# Patient Record
Sex: FEMALE | Race: WHITE | NOT HISPANIC OR LATINO | Employment: OTHER | URBAN - METROPOLITAN AREA
[De-identification: names, ages, dates, MRNs, and addresses within clinical notes are randomized per-mention and may not be internally consistent; named-entity substitution may affect disease eponyms.]

---

## 2018-02-12 ENCOUNTER — CLINICAL SUPPORT (OUTPATIENT)
Dept: CARDIOLOGY | Facility: CLINIC | Age: 33
End: 2018-02-12
Payer: MEDICARE

## 2018-02-12 ENCOUNTER — LAB VISIT (OUTPATIENT)
Dept: LAB | Facility: OTHER | Age: 33
End: 2018-02-12
Payer: MEDICARE

## 2018-02-12 ENCOUNTER — OFFICE VISIT (OUTPATIENT)
Dept: CARDIOLOGY | Facility: CLINIC | Age: 33
End: 2018-02-12
Payer: MEDICARE

## 2018-02-12 ENCOUNTER — TELEPHONE (OUTPATIENT)
Dept: CARDIOLOGY | Facility: CLINIC | Age: 33
End: 2018-02-12

## 2018-02-12 VITALS
WEIGHT: 109.38 LBS | SYSTOLIC BLOOD PRESSURE: 104 MMHG | OXYGEN SATURATION: 98 % | HEART RATE: 50 BPM | DIASTOLIC BLOOD PRESSURE: 61 MMHG

## 2018-02-12 DIAGNOSIS — Q21.20 AV CANAL: ICD-10-CM

## 2018-02-12 DIAGNOSIS — E03.9 HYPOTHYROIDISM, UNSPECIFIED TYPE: ICD-10-CM

## 2018-02-12 DIAGNOSIS — Z95.0 CARDIAC PACEMAKER IN SITU: ICD-10-CM

## 2018-02-12 DIAGNOSIS — R42 LIGHTHEADEDNESS: Primary | ICD-10-CM

## 2018-02-12 DIAGNOSIS — Q21.20 AV CANAL: Primary | ICD-10-CM

## 2018-02-12 DIAGNOSIS — R42 LIGHTHEADEDNESS: ICD-10-CM

## 2018-02-12 LAB
ALBUMIN SERPL BCP-MCNC: 4 G/DL
ALP SERPL-CCNC: 62 U/L
ALT SERPL W/O P-5'-P-CCNC: 22 U/L
ANION GAP SERPL CALC-SCNC: 11 MMOL/L
AST SERPL-CCNC: 30 U/L
BASOPHILS # BLD AUTO: 0.04 K/UL
BASOPHILS NFR BLD: 0.6 %
BILIRUB SERPL-MCNC: 0.9 MG/DL
BUN SERPL-MCNC: 13 MG/DL
CALCIUM SERPL-MCNC: 10.3 MG/DL
CHLORIDE SERPL-SCNC: 102 MMOL/L
CO2 SERPL-SCNC: 27 MMOL/L
CREAT SERPL-MCNC: 0.8 MG/DL
DIFFERENTIAL METHOD: ABNORMAL
EOSINOPHIL # BLD AUTO: 0.1 K/UL
EOSINOPHIL NFR BLD: 1.1 %
ERYTHROCYTE [DISTWIDTH] IN BLOOD BY AUTOMATED COUNT: 14.1 %
EST. GFR  (AFRICAN AMERICAN): >60 ML/MIN/1.73 M^2
EST. GFR  (NON AFRICAN AMERICAN): >60 ML/MIN/1.73 M^2
GLUCOSE SERPL-MCNC: 78 MG/DL
HCT VFR BLD AUTO: 43.5 %
HGB BLD-MCNC: 15 G/DL
LYMPHOCYTES # BLD AUTO: 1.8 K/UL
LYMPHOCYTES NFR BLD: 24.8 %
MCH RBC QN AUTO: 31.6 PG
MCHC RBC AUTO-ENTMCNC: 34.5 G/DL
MCV RBC AUTO: 92 FL
MONOCYTES # BLD AUTO: 0.4 K/UL
MONOCYTES NFR BLD: 6.2 %
NEUTROPHILS # BLD AUTO: 4.7 K/UL
NEUTROPHILS NFR BLD: 67.2 %
PLATELET # BLD AUTO: 250 K/UL
PMV BLD AUTO: 9.3 FL
POTASSIUM SERPL-SCNC: 4 MMOL/L
PROT SERPL-MCNC: 7.4 G/DL
RBC # BLD AUTO: 4.75 M/UL
SODIUM SERPL-SCNC: 140 MMOL/L
T4 FREE SERPL-MCNC: 0.72 NG/DL
TSH SERPL DL<=0.005 MIU/L-ACNC: 7.36 UIU/ML
WBC # BLD AUTO: 7.05 K/UL

## 2018-02-12 PROCEDURE — 84443 ASSAY THYROID STIM HORMONE: CPT

## 2018-02-12 PROCEDURE — 93272 ECG/REVIEW INTERPRET ONLY: CPT | Mod: S$GLB,,, | Performed by: PEDIATRICS

## 2018-02-12 PROCEDURE — 93325 DOPPLER ECHO COLOR FLOW MAPG: CPT | Mod: TC,S$GLB,, | Performed by: PEDIATRICS

## 2018-02-12 PROCEDURE — 93000 ELECTROCARDIOGRAM COMPLETE: CPT | Mod: 59,S$GLB,, | Performed by: PEDIATRICS

## 2018-02-12 PROCEDURE — 80053 COMPREHEN METABOLIC PANEL: CPT

## 2018-02-12 PROCEDURE — 93303 ECHO TRANSTHORACIC: CPT | Mod: TC,S$GLB,, | Performed by: PEDIATRICS

## 2018-02-12 PROCEDURE — 84439 ASSAY OF FREE THYROXINE: CPT

## 2018-02-12 PROCEDURE — 93320 DOPPLER ECHO COMPLETE: CPT | Mod: TC,S$GLB,, | Performed by: PEDIATRICS

## 2018-02-12 PROCEDURE — 85025 COMPLETE CBC W/AUTO DIFF WBC: CPT

## 2018-02-12 PROCEDURE — 36415 COLL VENOUS BLD VENIPUNCTURE: CPT

## 2018-02-12 PROCEDURE — 99204 OFFICE O/P NEW MOD 45 MIN: CPT | Mod: 25,S$GLB,, | Performed by: PEDIATRICS

## 2018-02-12 RX ORDER — MIDODRINE HYDROCHLORIDE 5 MG/1
5 TABLET ORAL 2 TIMES DAILY WITH MEALS
COMMUNITY
End: 2023-03-27

## 2018-02-12 RX ORDER — ASCORBIC ACID 500 MG
500 TABLET ORAL DAILY
COMMUNITY

## 2018-02-12 RX ORDER — VIT C/E/ZN/COPPR/LUTEIN/ZEAXAN 250MG-90MG
1000 CAPSULE ORAL DAILY
COMMUNITY

## 2018-02-12 RX ORDER — ASPIRIN 81 MG/1
81 TABLET ORAL DAILY
COMMUNITY
End: 2022-03-10 | Stop reason: SDUPTHER

## 2018-02-13 NOTE — PROGRESS NOTES
"    I would like to refer Kan Matamoros to Endocrinology.   She is a 32 year old with Down syndrome.  (S/P) Heart Surgery as a child.  (S/P) Pacemaker. She comes with complaints of fatigue, weakness, lightheadedness, and near syncope.  I checked TFTs because of Down Syndrome.   Free T4 is 0.72    And TSH is 7.36.               HPI:  We had the pleasure of seeing Kan Matamoros. As you may recall, she is a 32 year old  female with Down Syndrome.  She is seen today in the [facility] Ad[facility] with Congenital Heart Disease, that is held at [facility] in [place], on Feb 12th, 2018.  As a baby, according to the parents notes, this patient was diagnosed to have a large VSD and a PDA with heart failure. She initially was followed by Dr Daniele Carrasco at [facility]. She underwent surgery by Dr Roel Carr at [facility] in May of 1986, at 1 years of age.  The operation entailed dacron patch closure of the VSD and ligation of the PDA.  Following surgery, her heart failure resolved, and she did well thereafter.  She was lost to follow-up for a number of years.  According to mom, in 2016, Kan presented with a heart rate of ~ 30 bpm.  She required placement of a transvenous dual chamber AV pacemaker.      She recently developed short-lived episodes of  " fatigue, weakness, lightheadedness and near syncope". As part of her work-up,  she underwent a tilt test which, according to mom, was positive, with Kan fainting while standing.  She was started on proamatine, with no real improvement in symptoms.  It appears she had been taking 5 mg 2 times a day*. Additionally, Dr Dunne saw Kan recently and, by downloading the pacemaker, found an 8 beat run of VT.  She was sent to us to investigate if there are any residual cardiac issues associated with her congenital heart disease. The family denies there are problems with:  SOB, CP, abnormally slow or rapid HRs, abdominal or pelvic pain, abnormal bowel " "movements or dysuria. It should be mentioned that, to mom's recollection, Kan has not had TFTs evaluated; thyroid dysfunction is very common with Down Syndrome, especially in this age group.    * The recommended dose of ProAmatine® is 5-10 mgs, "3" times daily. Dosing should take place during the daytime hours when the patient needs to be upright, pursuing the activities of daily living. A suggested dosing schedule of approximately 4-hour intervals is as follows: shortly before, or upon arising in the morning, midday and late afternoon (not later than 6 P.M.). Doses may be given in 3-hour intervals,         ROS:  She uses glasses. Her hearing is normal. No swallowing disorder or reflux.  She has reasonably good endurance.  There is no abdominal pain. No constipation or diarrhea. No pelvic pain.  Normal urination. No known thyroid disease, DM or bleeding disorder. No arterial disease, HBP or lipid disorder. No current problem with the lungs, liver or kidneys. No joint pain or swelling, and full range of motion.  No rashes, cyanosis or clubbing.       PHYSICAL EXAM: Healthy-looking, oriented, 32 year old female with Down Syndrome and in no distress.  WT 49.6 kg. (109 lbs). HR 50 bpm.  Sat 98%.  BP /61. Good color and perfusion.  HEENT:  Normal facial movements. Normal eye movements. PERR. No bruits over the head. Mucus membranes are moist and pink. No JVD.   Neck supple. Thyroid is not enlarged. CHEST: Normal chest movement with breathing. Good air entry. Clear breath sounds. No wheezing, rhonchi or rales.  HEART:  Normal precordial activity. S1 and S2 are normal. A Gr II/VI soft regurg systolic murmur at the LLSB. A Gr I/VI CORNELL up the LSB. No DM. No gallop, rub or clicks.   ABD:  Soft. Liver is at the RCM and non-tender or pulsitile.  Normal BSs. EXTS:  Full ROM. No joint pain or swelling. No rashes or cyanosis.  Pulses are 2+.             ECG:  AV dual-chamber rhythm. Prolonged QRS complexes.    ECHO: No " "pericardial effusion. Four cardiac chambers. No clots or vegetations.  There may be a defect in the medial (septal) leaflet of the tricuspid valve.  The other valves appear relativity normal.  The AV valves seem to lie on the same plane.  No residual ASD or VSD. No RVOT or LVOT obstruction. Pacing wire is seen in the RV, which crosses the R-AV valve. No arch obstruction seen. Standard M-Mode measurements show: LV 3.8 cm.  RV 1.6 cm.  RA 3.6 x 3.5 cm.  Ao root 2.4 cm.  LA 3.0 cm.  Sep 0.8 cm.  PW 0.8 cm. EF is at most 50%. The region of the LV-apex and distal septum contract less well.  Doppler analysis reveals moderate (+) TR, with a jet of 33 mmHg indicating mild elevation in R-sided pressure.  The jet may arise from the previously-mentioned defect in the septal leaflet of the R-AV valve and/or from the pacing wire crossing the valve..  Trivial PI. Peak pressure drop across the pulmonary valve is 14 mmHg. Trivial MR. Trivial AI. Peak pressure drop across the Ao valve is 4 mmHg.  No residual PDA.      ASSESSMENT/PLAN:  A 32 year old with Down Syndrome, (S/P) surgical repair of a VSD and PDA as a child.   I suspect she originally had a variant of an AV Canal. She recently had a transvenous Dual Chamber AV pacemaker placed for severe bradycardia. She presents now with episodes of  " fatigue, weakness, lightheadedness and near syncope" .  ECGs show a northwest frontal QRS axis indicative of an AV canal..  She has several issues that need to be addressed. They are as follows:    1. Moderate residual TR either from a defect in the septal leaflet of the R-AV valve and/or from the pacing wire crossing the valve. The RA is not significantly dilated.  2. Download of the pacemaker by Dr Dunne showed 8 beat run of VT. We have provided her with an event recorder.  Also, her baseline HR is ~ 50 bpm. She may do better with a higher baseline rate, which could lessen ectopy.  3. Needs TFT.  Also CBC and CMP.  4. Would suggest " changing the ProAmatine® dosing to 5 mgs q 8hrs.  5. Consider exercise stress test.  6. Will discuss patient with Cesar Anderson and Uzair.    Vazquez Westbrook PhD, MD.           (My cell is )      PS  It appears that she may be hypothyroid, free T4 is low and TSH is elevated, which could explain her symptoms.      Vazquez Westbrook PhD, MD    Congenital Cardiology.

## 2018-03-06 ENCOUNTER — OFFICE VISIT (OUTPATIENT)
Dept: ENDOCRINOLOGY | Facility: CLINIC | Age: 33
End: 2018-03-06
Payer: MEDICARE

## 2018-03-06 VITALS
SYSTOLIC BLOOD PRESSURE: 102 MMHG | HEART RATE: 70 BPM | BODY MASS INDEX: 24.84 KG/M2 | DIASTOLIC BLOOD PRESSURE: 68 MMHG | WEIGHT: 110.44 LBS | HEIGHT: 56 IN

## 2018-03-06 DIAGNOSIS — Q90.9 DOWN SYNDROME: ICD-10-CM

## 2018-03-06 DIAGNOSIS — E03.8 SUBCLINICAL HYPOTHYROIDISM: Primary | ICD-10-CM

## 2018-03-06 DIAGNOSIS — R79.9 ABNORMAL FINDING OF BLOOD CHEMISTRY: ICD-10-CM

## 2018-03-06 DIAGNOSIS — Q24.9 CONGENITAL HEART DISEASE IN ADULT: ICD-10-CM

## 2018-03-06 PROCEDURE — 99999 PR PBB SHADOW E&M-EST. PATIENT-LVL III: CPT | Mod: PBBFAC,,, | Performed by: INTERNAL MEDICINE

## 2018-03-06 PROCEDURE — 99204 OFFICE O/P NEW MOD 45 MIN: CPT | Mod: S$PBB,,, | Performed by: INTERNAL MEDICINE

## 2018-03-06 PROCEDURE — 99213 OFFICE O/P EST LOW 20 MIN: CPT | Mod: PBBFAC | Performed by: INTERNAL MEDICINE

## 2018-03-06 RX ORDER — LEVOTHYROXINE SODIUM 25 UG/1
25 TABLET ORAL DAILY
Qty: 30 TABLET | Refills: 11 | Status: SHIPPED | OUTPATIENT
Start: 2018-03-06 | End: 2018-03-19 | Stop reason: ALTCHOICE

## 2018-03-06 NOTE — ASSESSMENT & PLAN NOTE
Hypothyroidism is very common in Down syndrome patients.    It is difficult to distinguish if the fatigue is related to heart issues or hypothyroidism, so the benefit of treatment is unclear. However, there is little harm in trying a low dose of levothyroxine to see if it will improve the fatigue, so we will start her on 25 mcg levothyroxine.    Recheck TSH/FT4 in 8 weeks, and will follow levels periodically after.

## 2018-03-06 NOTE — PROGRESS NOTES
Subjective:      Chief Complaint: Hypothyroidism      HPI: Kan Matamoros is a 32 y.o. female who is here for an initial evaluation for hypothyroidism.    She has Down syndrome and has a history of large VSD and a PDA with heart failure, which was repaired when she was 1 years old. She has been referred from Dr. Westbrook due to abnormal TFTs. She has had episodes of fatigue, weakness, lightheadedness and near syncope, for which she underwent tilt-table testing. This was positive, and she was started on midodrine. Her mom has noticed she is a little more fatigued than usual for the past several months. When walking long distances, such as in [employer], she sometimes has to stop to rest. She has also noticed she is falling asleep on car rides, which is not usual for her. She does well at her school and participates in activities. There was one episode at school where she felt tired and per the care attendant, was disoriented. She hasn't had any similar episodes before or after that event.    Current symptoms:   Fatigue    Denies:  weight gain  Constipation   Hair loss  Brittle nails  Mental fog     She is still having periods regularly once per month    Taking vitamin D once daily    Reviewed past medical, family, social history and updated as appropriate.    Review of Systems   Constitutional: Positive for activity change (Fatigued when walking long distances.) and fatigue. Negative for unexpected weight change.   Eyes: Negative for visual disturbance.   Respiratory: Negative for shortness of breath.    Cardiovascular: Negative for chest pain.   Gastrointestinal: Negative for abdominal pain.   Genitourinary: Negative for urgency.   Musculoskeletal: Negative for arthralgias.   Skin: Negative for wound.   Neurological: Negative for headaches.   Hematological: Does not bruise/bleed easily.   Psychiatric/Behavioral: Negative for sleep disturbance.     Objective:     Vitals:    03/06/18 0753   BP: 102/68   Pulse: 70        Physical Exam   Constitutional: She is oriented to person, place, and time. She appears well-developed and well-nourished. No distress.   HENT:   Right Ear: External ear normal.   Left Ear: External ear normal.   Nose: Nose normal.   Hearing grossly normal  Dentition grossly normal  Classic facial features of Down syndrome   Eyes: Conjunctivae are normal. Right eye exhibits no discharge. Left eye exhibits no discharge.   Neck: No tracheal deviation present. No thyromegaly present.   Cardiovascular: Normal rate and regular rhythm.    Murmur heard.  PM generator subcutaneously in left chest wall   Pulmonary/Chest: Effort normal and breath sounds normal.   Abdominal: Soft. She exhibits no mass. There is no tenderness.   Musculoskeletal: She exhibits no edema.   Gait Normal  No digital clubbing or extremity cyanosis   Neurological: She is alert and oriented to person, place, and time. Coordination normal.   Skin: No rash noted.   No subcutaneous nodules noted.  Mottled skin in extremities without cyanosis.   Psychiatric: She has a normal mood and affect.   Alert and oriented to person, place, and situation.  Polite, smiling, interactive.   Nursing note and vitals reviewed.      Wt Readings from Last 10 Encounters:   03/06/18 0753 50.1 kg (110 lb 7.2 oz)   02/12/18 1011 49.6 kg (109 lb 5.6 oz)     Lab Results   Component Value Date     02/12/2018    K 4.0 02/12/2018     02/12/2018    CO2 27 02/12/2018    GLU 78 02/12/2018    BUN 13 02/12/2018    CREATININE 0.8 02/12/2018    CALCIUM 10.3 02/12/2018    PROT 7.4 02/12/2018    ALBUMIN 4.0 02/12/2018    BILITOT 0.9 02/12/2018    ALKPHOS 62 02/12/2018    AST 30 02/12/2018    ALT 22 02/12/2018    ANIONGAP 11 02/12/2018    ESTGFRAFRICA >60 02/12/2018    EGFRNONAA >60 02/12/2018    TSH 7.364 (H) 02/12/2018        Assessment/Plan:     Subclinical hypothyroidism  Hypothyroidism is very common in Down syndrome patients.    It is difficult to distinguish if the  fatigue is related to heart issues or hypothyroidism, so the benefit of treatment is unclear. However, there is little harm in trying a low dose of levothyroxine to see if it will improve the fatigue, so we will start her on 25 mcg levothyroxine.    Recheck TSH/FT4 in 8 weeks, and will follow levels periodically after.    Down syndrome  Endocrinopathies associated with Down syndrome include hypothyroidism and diabetes (positively correlated with type 1 diabetes). Her glucose has been normal on chemistry, but will check screening HbA1c.     She is not obese, and her mom takes good care to avoid high calorie, high carbohydrate foods in her diet.    Congenital heart disease in adult  Followed by Dr. Westbrook.    Will titrate thyroid medications slowly.    RTC in 1 year    I discussed the case with Dr. Ward and she is in agreement with the plan.  I, Rere Ward MD,  have personally taken the history and examined the patient and agree with the resident's note as stated above.

## 2018-03-06 NOTE — ASSESSMENT & PLAN NOTE
Endocrinopathies associated with Down syndrome include hypothyroidism and diabetes (positively correlated with type 1 diabetes). Her glucose has been normal on chemistry, but will check screening HbA1c.     She is not obese, and her mom takes good care to avoid high calorie, high carbohydrate foods in her diet.

## 2018-03-16 DIAGNOSIS — E03.8 SUBCLINICAL HYPOTHYROIDISM: Primary | ICD-10-CM

## 2018-03-16 NOTE — TELEPHONE ENCOUNTER
----- Message from Alejandro Henry sent at 3/16/2018 11:55 AM CDT -----  Contact: pt mom  Pt mom called in about wanting to schedule appt. Pt mom wants to know when is the pt next appt and labs.      Pt mom can be reached at [phone] ty

## 2018-03-19 RX ORDER — LEVOTHYROXINE SODIUM 25 UG/1
25 TABLET ORAL DAILY
Qty: 30 TABLET | Refills: 11 | Status: SHIPPED | OUTPATIENT
Start: 2018-03-19 | End: 2018-04-24 | Stop reason: ALTCHOICE

## 2018-03-19 NOTE — TELEPHONE ENCOUNTER
Spoke with mom.  She knows 2 month labs are scheduled for follow up.  Mom states she feels brand name Synthroid would be better for patient. She knows it will cost her more but is willing to pay.  Please send in new rx to [facility] if ok with you.

## 2018-04-23 ENCOUNTER — LAB VISIT (OUTPATIENT)
Dept: LAB | Facility: HOSPITAL | Age: 33
End: 2018-04-23
Attending: INTERNAL MEDICINE
Payer: MEDICARE

## 2018-04-23 DIAGNOSIS — Q90.9 DOWN SYNDROME: ICD-10-CM

## 2018-04-23 DIAGNOSIS — R79.9 ABNORMAL FINDING OF BLOOD CHEMISTRY: ICD-10-CM

## 2018-04-23 DIAGNOSIS — E03.8 SUBCLINICAL HYPOTHYROIDISM: ICD-10-CM

## 2018-04-23 LAB
ESTIMATED AVG GLUCOSE: 85 MG/DL
HBA1C MFR BLD HPLC: 4.6 %
T4 FREE SERPL-MCNC: 0.85 NG/DL
TSH SERPL DL<=0.005 MIU/L-ACNC: 6.06 UIU/ML

## 2018-04-23 PROCEDURE — 36415 COLL VENOUS BLD VENIPUNCTURE: CPT | Mod: PO

## 2018-04-23 PROCEDURE — 83036 HEMOGLOBIN GLYCOSYLATED A1C: CPT

## 2018-04-23 PROCEDURE — 84443 ASSAY THYROID STIM HORMONE: CPT

## 2018-04-23 PROCEDURE — 84439 ASSAY OF FREE THYROXINE: CPT

## 2018-04-24 ENCOUNTER — TELEPHONE (OUTPATIENT)
Dept: ENDOCRINOLOGY | Facility: CLINIC | Age: 33
End: 2018-04-24

## 2018-04-24 DIAGNOSIS — E03.8 SUBCLINICAL HYPOTHYROIDISM: Primary | ICD-10-CM

## 2018-04-24 RX ORDER — LEVOTHYROXINE SODIUM 50 UG/1
50 TABLET ORAL
Qty: 30 TABLET | Refills: 11 | Status: SHIPPED | OUTPATIENT
Start: 2018-04-24 | End: 2019-05-20 | Stop reason: SDUPTHER

## 2018-04-24 NOTE — TELEPHONE ENCOUNTER
I spoke with Chad (mom) regarding the TSH, which is just slightly improved on the 25 mcg dose of synthroid. She has not noticed any difference in the fatigue she's been having. She confirmed that she is taking it in the AM, without food or drink and has not missed any doses. We will increase the dose to 50 mcg daily and recheck TSH in 8 weeks. She prefers sticking to brand name for now.

## 2018-04-26 ENCOUNTER — TELEPHONE (OUTPATIENT)
Dept: ENDOCRINOLOGY | Facility: CLINIC | Age: 33
End: 2018-04-26

## 2018-06-19 ENCOUNTER — LAB VISIT (OUTPATIENT)
Dept: LAB | Facility: HOSPITAL | Age: 33
End: 2018-06-19
Attending: INTERNAL MEDICINE
Payer: MEDICARE

## 2018-06-19 DIAGNOSIS — E03.8 SUBCLINICAL HYPOTHYROIDISM: ICD-10-CM

## 2018-06-19 LAB — TSH SERPL DL<=0.005 MIU/L-ACNC: 3.33 UIU/ML

## 2018-06-19 PROCEDURE — 36415 COLL VENOUS BLD VENIPUNCTURE: CPT | Mod: PO

## 2018-06-19 PROCEDURE — 84443 ASSAY THYROID STIM HORMONE: CPT

## 2018-06-20 ENCOUNTER — TELEPHONE (OUTPATIENT)
Dept: ENDOCRINOLOGY | Facility: CLINIC | Age: 33
End: 2018-06-20

## 2018-06-20 DIAGNOSIS — E03.8 SUBCLINICAL HYPOTHYROIDISM: Primary | ICD-10-CM

## 2018-06-20 NOTE — TELEPHONE ENCOUNTER
----- Message from Carlin Badillo MD sent at 6/20/2018 11:06 AM CDT -----  Please call her mom and let her know that her TSH is now in the normal range. She should continue taking the same dose of synthroid. We will repeat her level in 6 months. Thanks!

## 2018-06-20 NOTE — TELEPHONE ENCOUNTER
Called and spoke with patient's mom and given specific message per Dr. Badillo.  Mom verbalizes understanding.

## 2018-07-09 NOTE — PROGRESS NOTES
"    Progress Notes                           HPI:  We had the pleasure of seeing Kan Matamoros. As you may recall, she is a 32 year old  female with Down Syndrome.  She is seen today in the [facility] for Adults with Congenital Heart Disease, that is held at [facility] in [place], on July 10th, 2018.  As a baby, according to the parents notes, this patient was diagnosed to have a large VSD and a PDA with heart failure. She initially was followed by Dr Daniele Carrasco at [facility]. She underwent surgery by Dr Roel Carr at [facility] in May of 1986, at 1 years of age.  The operation entailed dacron patch closure of the VSD and ligation of the PDA.  Following surgery, her heart failure resolved, and she did well thereafter.  She was lost to follow-up for a number of years.  According to mom, in 2016, Kan presented with a heart rate of ~ 30 bpm.  She required placement of a transvenous dual chamber AV pacemaker.       She recently developed short-lived episodes of  " fatigue, weakness, lightheadedness and near syncope". As part of her work-up,  she underwent a tilt test which, according to mom, was positive, with Kan fainting while standing.  She was started on proamatine, with no real improvement in symptoms.  It appears she had been taking 5 mg 2 times a day*. Additionally, Dr Dunne saw Kan recently and, by downloading the pacemaker, found an 8 beat run of VT.  She was sent to us to investigate if there are any residual cardiac issues associated with her congenital heart disease. The family denies there are problems with:  seizures, SOB, CP, abnormally slow or rapid HRs (since the pacemaker  Was placed), abdominal or pelvic pain, abnormal bowel movements or dysuria. By the way, the recommended dose of ProAmatine® is 5-10 mgs, "3" times daily. Dosing should take place during the daytime hours when the patient is upright, pursuing the activities of daily living. A suggested dosing " "schedule of approximately 4-hour intervals is as follows: shortly before, or upon arising in the morning, midday and late afternoon (not later than 6 P.M.).  Mom reports that Kan is only taking 5 mg q 12 hrs. She continues to have episodes of lightheadedness when standing up quickly.  Her thyroid test (TSH) is now normal. Still not totally sure why she is having these episodes ol "weakness, fatigue and lightheadedness".  No syncope.              ROS:  She uses glasses. Her hearing is normal. No swallowing disorder or reflux. No constipation or diarrhea. We found her to have low thyroid and placed on medication.  No DM or bleeding disorder.No hematological disorder.  No arterial disease, HBP or lipid disorder. No current intrinsic problem with the lungs, liver or kidneys. No joint pain and full range of motion. No rashes, cyanosis or edema.          PHYSICAL EXAM: Healthy-looking, oriented, 32 year old female with Down Syndrome and in no distress.   WT 48.9 kg. (108 lbs). HR 66 bpm.  Sat 97 %.  BP LA 98/66 mmHg. Good color and perfusion.  HEENT:  Normal facial movements. Normal eye movements. No bruits over the head. Mucus membranes are moist and pink. No JVD. Neck supple. Thyroid is not enlarged. CHEST: Well-healed mid-line scar. Normal chest movement with breathing. Good air entry. Clear breath sounds. No wheezing, rhonchi or rales. HEART: Normal precordial activity. S1 and S2 are normal. A Gr II/VI soft regurg systolic murmur at the LLSB. A Gr I/VI CORNELL up the LSB. No DM. No gallop, rub or click. ABD:  Soft. Liver is at the RCM and non-tender or pulsitile.  Normal BSs.  EXTS:  Full ROM. No joint pain or swelling. No rashes or cyanosis.  Pulses are 2+ throughout.                 ........................................................................    ECG:  AV dual-chamber rhythm. Sup[erior axis (NW). Atrial sensed and ventricular paced. Prolonged QRS complexes.     ECHO: No pericardial effusion. Four cardiac " "chambers. No clots or vegetations.  There may be a defect in the medial (septal) leaflet of the R-AV valve.  The other valves appear relativity normal.  The AV valves seem to lie on the same plane.  No residual ASD or VSD. No RVOT or LVOT obstruction. Pacing wire is seen in the RV, which crosses the R-AV valve. No arch obstruction. Standard M-Mode measurements show: LV 3.8 cm.  RV 1.8 cm.  RA 3.6 x 3.5 cm.  Ao root 2.4 cm.  LA 3.0 cm.  Sep 0.8 cm.  PW 0.8 cm. EF is 61 %. The region of the LV-apex and distal septum contract less well.  Doppler analysis reveals moderate (+) R-AV valve regurg, with a jet of 33 mmHg indicating mild elevation in R-sided pressure.  The jet may arise from the previously-mentioned defect in the septal leaflet of the R-AV valve and/or from the pacing wire crossing the valve.  Trivial PI. Peak pressure drop across the pulmonary valve is 5 mmHg. Trivial L-AV valve regurg. Trivial AI. Peak pressure drop across the Ao valve is 6 mmHg.  No residual PDA.        ..........................................................................      ASSESSMENT/PLAN:  A 32 year old with Down Syndrome, (S/P) surgical repair of a VSD and PDA as a child.   I suspect she originally had a variant of an AV Canal. She recently had a transvenous Dual Chamber AV pacemaker placed for severe bradycardia. She presents now with episodes of  " fatigue, weakness and lightheadedness" .  She has several issues that need to be addressed. They are as follows:     1. Moderate residual R-AV valve regurg, either from a defect in the septal leaflet of the R-AV valve and/or from the pacing wire crossing the valve. The RA is dilated.  It measures 21.3 cm2  2. Would schedule her for a exercise walk test on the treadmill.  3. Needs TFTs re-checked.  Also CBC and CMP. 4. Would change the ProAmatine® dosing to 5 mgs q 8hrs. 5. Start her on B Complex supplements.   6. Will discuss patient with Cesar Anderson and Uzair.   Vazquez Westbrook" PhD, MD.

## 2018-07-10 ENCOUNTER — OFFICE VISIT (OUTPATIENT)
Dept: CARDIOLOGY | Facility: CLINIC | Age: 33
End: 2018-07-10
Payer: MEDICARE

## 2018-07-10 ENCOUNTER — CLINICAL SUPPORT (OUTPATIENT)
Dept: CARDIOLOGY | Facility: CLINIC | Age: 33
End: 2018-07-10
Payer: MEDICARE

## 2018-07-10 VITALS
HEIGHT: 56 IN | WEIGHT: 107.69 LBS | DIASTOLIC BLOOD PRESSURE: 66 MMHG | SYSTOLIC BLOOD PRESSURE: 98 MMHG | BODY MASS INDEX: 24.23 KG/M2 | OXYGEN SATURATION: 97 % | HEART RATE: 66 BPM

## 2018-07-10 DIAGNOSIS — Q90.9 DOWN SYNDROME: ICD-10-CM

## 2018-07-10 DIAGNOSIS — Z95.0 CARDIAC PACEMAKER IN SITU: ICD-10-CM

## 2018-07-10 DIAGNOSIS — E03.8 OTHER SPECIFIED HYPOTHYROIDISM: Primary | ICD-10-CM

## 2018-07-10 DIAGNOSIS — E03.8 SUBCLINICAL HYPOTHYROIDISM: ICD-10-CM

## 2018-07-10 DIAGNOSIS — Z98.890 HISTORY OF OPEN HEART SURGERY: ICD-10-CM

## 2018-07-10 DIAGNOSIS — R42 LIGHTHEADEDNESS: ICD-10-CM

## 2018-07-10 PROCEDURE — 93000 ELECTROCARDIOGRAM COMPLETE: CPT | Mod: 59,S$GLB,, | Performed by: PEDIATRICS

## 2018-07-10 PROCEDURE — 93306 TTE W/DOPPLER COMPLETE: CPT | Mod: S$GLB,,, | Performed by: PEDIATRICS

## 2018-07-10 PROCEDURE — 99214 OFFICE O/P EST MOD 30 MIN: CPT | Mod: S$GLB,,, | Performed by: PEDIATRICS

## 2018-08-03 ENCOUNTER — LAB VISIT (OUTPATIENT)
Dept: LAB | Facility: HOSPITAL | Age: 33
End: 2018-08-03
Attending: PEDIATRICS
Payer: MEDICARE

## 2018-08-03 DIAGNOSIS — E03.8 OTHER SPECIFIED HYPOTHYROIDISM: ICD-10-CM

## 2018-08-03 DIAGNOSIS — R42 LIGHTHEADEDNESS: ICD-10-CM

## 2018-08-03 DIAGNOSIS — Q90.9 DOWN SYNDROME: ICD-10-CM

## 2018-08-03 DIAGNOSIS — E03.8 SUBCLINICAL HYPOTHYROIDISM: ICD-10-CM

## 2018-08-03 LAB
T4 FREE SERPL-MCNC: 1 NG/DL
TSH SERPL DL<=0.005 MIU/L-ACNC: 2.99 UIU/ML

## 2018-08-03 PROCEDURE — 84443 ASSAY THYROID STIM HORMONE: CPT

## 2018-08-03 PROCEDURE — 36415 COLL VENOUS BLD VENIPUNCTURE: CPT | Mod: PO

## 2018-08-03 PROCEDURE — 84439 ASSAY OF FREE THYROXINE: CPT

## 2018-08-07 ENCOUNTER — CLINICAL SUPPORT (OUTPATIENT)
Dept: CARDIOLOGY | Facility: CLINIC | Age: 33
End: 2018-08-07
Payer: MEDICARE

## 2018-08-07 ENCOUNTER — OFFICE VISIT (OUTPATIENT)
Dept: CARDIOLOGY | Facility: CLINIC | Age: 33
End: 2018-08-07
Payer: MEDICARE

## 2018-08-07 VITALS
OXYGEN SATURATION: 98 % | WEIGHT: 107.5 LBS | HEIGHT: 56 IN | SYSTOLIC BLOOD PRESSURE: 111 MMHG | DIASTOLIC BLOOD PRESSURE: 62 MMHG | HEART RATE: 76 BPM | BODY MASS INDEX: 24.18 KG/M2

## 2018-08-07 DIAGNOSIS — Z98.890 HISTORY OF OPEN HEART SURGERY: ICD-10-CM

## 2018-08-07 DIAGNOSIS — Z95.0 CARDIAC PACEMAKER IN SITU: ICD-10-CM

## 2018-08-07 DIAGNOSIS — I07.1 TRICUSPID VALVE INSUFFICIENCY, UNSPECIFIED ETIOLOGY: ICD-10-CM

## 2018-08-07 DIAGNOSIS — I07.1 TRICUSPID VALVE INSUFFICIENCY: ICD-10-CM

## 2018-08-07 DIAGNOSIS — Q90.9 DOWN SYNDROME: ICD-10-CM

## 2018-08-07 DIAGNOSIS — T73.3XXA FATIGUE DUE TO EXCESSIVE EXERTION, INITIAL ENCOUNTER: Primary | ICD-10-CM

## 2018-08-07 PROCEDURE — 93303 ECHO TRANSTHORACIC: CPT | Mod: S$GLB,,, | Performed by: PEDIATRICS

## 2018-08-07 PROCEDURE — 93325 DOPPLER ECHO COLOR FLOW MAPG: CPT | Mod: S$GLB,,, | Performed by: PEDIATRICS

## 2018-08-07 PROCEDURE — 93320 DOPPLER ECHO COMPLETE: CPT | Mod: S$GLB,,, | Performed by: PEDIATRICS

## 2018-08-07 PROCEDURE — 93000 ELECTROCARDIOGRAM COMPLETE: CPT | Mod: S$GLB,,, | Performed by: PEDIATRICS

## 2018-08-07 PROCEDURE — 99214 OFFICE O/P EST MOD 30 MIN: CPT | Mod: S$GLB,,, | Performed by: PEDIATRICS

## 2018-08-07 RX ORDER — VITAMIN B COMPLEX
1 CAPSULE ORAL DAILY
COMMUNITY

## 2018-08-07 NOTE — PROGRESS NOTES
"                     Progress Note  Aug 6th, 2018.               HPI:  We had the pleasure of seeing Kan Matamoros. As you may recall, she is a 33 year old  female with Down Syndrome.  She is seen today in the [facility] Congenital Heart Disease, that is held at [facility], [place], operated by [facility] in [place], on Aug 6th, 2018.  As a baby, this patient was diagnosed to have a large VSD and a PDA with heart failure. She initially was followed by Dr Daniele Carrasco at [facility], [place], operated by [facility]. She underwent surgery by Dr Roel Carr at [facility], [place], operated by [facility] in May of 1986, at 1 years of age. The operation entailed dacron patch closure of the VSD and ligation of the PDA. Following surgery, her heart failure resolved, and she did well thereafter.  She was lost to follow-up for a number of years.  According to mom, in 2016, Kan presented with a heart rate of ~ 30 bpm.  She required placement of a transvenous dual chamber AV pacemaker.       She recently developed short-lived episodes of  " fatigue, weakness, lightheadedness and near syncope". As part of her work-up,  she underwent a tilt test which, according to mom, was positive, with Kan fainting while standing.  She was started on proamatine, with no real improvement in symptoms.  It appears she had been taking 5 mg 2 times a day*. Additionally, Dr Dunne saw Kan recently and, by downloading the pacemaker, found an 8 beat run of VT.  She was sent to us to investigate if there are any residual cardiac issues associated with her congenital heart disease. The family denies there are problems with:  SOB, CP, abnormally slow or rapid HRs, abdominal or pelvic pain, abnormal bowel movements or dysuria. It should be mentioned that, to mom's recollection, Kan has not had TFTs evaluated; thyroid dysfunction is very common with Down Syndrome, especially in this age group.  She subsequently was seen by Endocrine and found to be hypothyroid.  She was started on synthroid.      Mom " "re[prts that Kan still has episodes of "weakness and fatigue", but there has been some improvement with the thyroid hormone.         .....................................................................     * P.S.  The recommended dose of ProAmatine® is 5-10 mgs, "3" times daily. Dosing should take place during the daytime hours when the patient needs to be upright, pursuing the activities of daily living. A suggested dosing schedule of approximately 4-hour intervals is as follows: shortly before, or upon arising in the morning, midday and late afternoon (not later than 6 P.M.). Doses may be given in 3-hour intervals,            ROS:  She uses glasses. Her hearing is normal. No swallowing disorder or reflux. No HAs, seizures or syncope.  There is no abdominal pain. No constipation or diarrhea. No pelvic pain.  Normal urination. No DM or bleeding disorder. No arterial disease, HBP or lipid disorder. No known intrinsic problems with the lungs, liver or kidneys. No joint pain or swelling, and full range of motion.  No rashes, cyanosis or clubbing.         PHYSICAL EXAM: Healthy-looking, oriented, 33 year old female with Down Syndrome and in no distress.  WT 48.8 kg. (1071/2 lbs). HR 70 bpm.  Sat 98%.  BP /62 sitting.  Lying down /64 and quickly standing up 108/69.  Good color and perfusion.  HEENT:  Normal facial movements. Normal eye movements. PERR. No bruits over the head. Mucus membranes are moist and pink. No JVD. Neck supple. Thyroid is not enlarged. CHEST: Well-healed mid-line scar.  Normal chest movement with breathing. Good air entry. Clear breath sounds. No wheezing, rhonchi or rales.  HEART:  Normal precordial activity. S1 and S2 are normal. A Gr II/VI soft regurg systolic murmur at the LLSB. A Gr I/VI CORNELL up the LUSB. No DM. No gallop, rub or clicks.   ABD:  Soft. Liver is at the RCM and non-tender or pulsitile.  Normal BSs. EXTS:  Full ROM. No joint pain or swelling. No edema, rashes, " "cyanosis or bruising.               .....................................................................................        ECG:  AV dual-chamber rhythm. A-sensed and V paced.  WV interval 210 ms. Prolonged AV conduction. Prolonged QRS complexes.           ECHO: No pericardial effusion. Four cardiac chambers. No clots or vegetations.  There may be a defect in the medial (septal) leaflet of the R- AV valve.  The other valves appear relativity normal.  The AV valves seem to lie on the same plane.  No residual ASD or VSD. No RVOT or LVOT obstruction. Pacing wire is seen in the RV, which crosses the R-AV valve. No aortic arch obstruction seen. Standard M-Mode measurements show: LV 3.7 cm.  RV 1.8 cm.  RA 4.4 x 4.0 cm (Area 15.7 cm2).  Ao root 2.4 cm.  LA 3.0 cm.  Sep 0.8 cm.  PW 0.7 cm. EF is at most 60 %. The region of the LV-apex and distal septum contract less well.  Doppler analysis reveals moderate (+) TR, with a jet of 33 mmHg indicating mild elevation in R-sided pressure.  The jet may arise from the previously-mentioned defect in the septal leaflet of the R-AV valve and/or from the pacing wire crossing the valve.  Trivial PI. Peak pressure drop across the pulmonary valve is 3 mmHg. Trivial MR. Trivial AI. Peak pressure drop across the Ao valve is 7 mmHg.  No residual PDA.        ........................................................................................        ASSESSMENT:  A 33 year old with Down Syndrome, (S/P) surgical repair of a VSD and PDA as a child.   I suspect she originally had a variant of an AV Canal. She recently had a transvenous Dual Chamber AV pacemaker placed for severe bradycardia. She presents now still with episodes of  " fatigue and weakness. But now with no lightheadedness or near syncope" .  ECGs show a northwest frontal QRS axis indicative of an AV canal. PLAN:  She has several issues that need to be addressed. 1. There is moderate residual R-AV valve regurg, either from a " defect in the septal leaflet of the R-AV valve and/or from the pacing wire crossing the valve. However, the RA is not significantly dilated (15.7 cm2).   2.  Her baseline HR is ~ 50 bpm. She may do better with a higher baseline rate, which could lessen ectopy. Obtain a 24 hr Holter to assess rate. 3. Needs TFT followup.  Also CBC and CMP.  4. Would suggest changing the ProAmatine® dosing to 5 mgs q 8hrs.  5. Consider exercise stress test to assess endurance.  Continue with vitamin supplements  6. Will discuss patient with Cesar Anderson and [facility].   Vazquez Westbrook PhD, MD.  (My cell is )                Vazquez Westbrook PhD, MD      I referred Kan Matamoros to Endocrinology.   She is a 33 year old female with Down syndrome.  (S/P) Heart Surgery as a child.  (S/P) Pacemaker. She comes with complaints of fatigue, weakness, lightheadedness, and near syncope.  I checked TFTs because of the Down Syndrome.   Free T4 was 0.72    And TSH is 7.36. These are abnormal. She did see Endocrinology and was started on Synthroid 50 mcg qd. Last T4 was 1.0 ng/dL and TSH was 3 uIU/mL. These are normal.

## 2018-12-20 ENCOUNTER — LAB VISIT (OUTPATIENT)
Dept: LAB | Facility: HOSPITAL | Age: 33
End: 2018-12-20
Attending: INTERNAL MEDICINE
Payer: MEDICARE

## 2018-12-20 DIAGNOSIS — E03.8 SUBCLINICAL HYPOTHYROIDISM: ICD-10-CM

## 2018-12-20 LAB — TSH SERPL DL<=0.005 MIU/L-ACNC: 3.08 UIU/ML

## 2018-12-20 PROCEDURE — 84443 ASSAY THYROID STIM HORMONE: CPT

## 2018-12-20 PROCEDURE — 36415 COLL VENOUS BLD VENIPUNCTURE: CPT | Mod: PO

## 2018-12-21 ENCOUNTER — TELEPHONE (OUTPATIENT)
Dept: ENDOCRINOLOGY | Facility: HOSPITAL | Age: 33
End: 2018-12-21

## 2018-12-21 NOTE — TELEPHONE ENCOUNTER
I spoke with MsKimberly Chad (mom). Her thyroid levels are normal on the 50 mcg Synthroid. She will continue the dose and we will recheck it in 6-12 months.

## 2019-01-16 NOTE — PROGRESS NOTES
"       HPI:  We had the pleasure of seeing Kan Matamoros. As you may recall, she is a 33 year old  female with Down Syndrome.  She is seen today in the [facility] with Congenital Heart Disease, that is held at [facility] [place] in [place], on Jan 17th, 2019.  As a baby, this patient was diagnosed to have a large VSD and a PDA with heart failure. She initially was followed by Dr Daniele Carrasco at [facility], [place]. She underwent surgery by Dr Roel Carr at [facility], [place] in May of 1986, at 1 years of age. The operation entailed dacron patch closure of the VSD and ligation of the PDA. Following surgery, her heart failure resolved, and she did well thereafter.  She was lost to follow-up for a number of years.  According to mom, in 2016, Kan presented with a heart rate of ~ 30 bpm.  She required placement of a transvenous dual chamber AV pacemaker.       She recently developed short-lived episodes of  " fatigue, weakness, lightheadedness and near syncope". As part of her work-up,  she underwent a tilt test which, according to mom, was positive, with Kan fainting while upright.  She was started on proamatine, with no real improvement in symptoms.  It appears she had been taking 5 mg 2 times a day*. Additionally, Dr Dunne saw Kan recently and, by downloading the pacemaker, found an 8 beat run of VT.  She was sent to us to investigate if there are any residual cardiac issues associated with her congenital heart disease. The family denies there are problems with:  SOB, CP, abnormally slow or rapid HRs, abdominal or pelvic pain, abnormal bowel movements or dysuria. It should be mentioned that, to mom's recollection, Kan has not had TFTs evaluated; thyroid dysfunction is very common with Down Syndrome, especially in this age group.  She subsequently was seen by Endocrine and found to be hypothyroid.  She was started on synthroid.        Mom reports that Kan still has episodes of "weakness and " "fatigue", but there has been an improvement with the thyroid hormone replacement.            * P.S.  The recommended dose of ProAmatine® is 5-10 mgs, "3" times daily. Dosing should take place during the daytime hours when the patient needs to be upright, pursuing the activities of daily living. A suggested dosing schedule of approximately 4-hour intervals is as follows: shortly before, or upon arising in the morning, midday and late afternoon (not later than 6 P.M.). Doses may be given in 3-hour intervals,            ROS:  She uses glasses. Her hearing is normal. No swallowing disorder or GE reflux. No HAs, seizures or syncope.  There is no abdominal pain. No constipation or diarrhea. No pelvic pain.  Normal urination. No DM or bleeding disorder. No arterial disease, HBP or lipid disorder. No known intrinsic problems with the lungs, liver or kidneys. No joint pain or swelling, and full range of motion.  No rashes, cyanosis or clubbing.         PHYSICAL EXAM: Healthy-looking, oriented, 33 year old female with Down Syndrome and in no distress.  WT 48.1 kg. (106 lbs). HR 81 bpm.  Sat 99%.  BP /67 sitting.  Lying down /64 and quickly standing up 108/69.  Good color and perfusion.  HEENT:  Normal facial movements. Normal eye movements. PERR. No bruits over the head. Mucus membranes are moist and pink. No JVD. Neck supple. Thyroid is not enlarged. CHEST: Well-healed mid-line scar.  Normal chest movement with breathing. Good air entry. Clear breath sounds. No wheezing, rhonchi or rales.  HEART:  Normal precordial activity. S1 and S2 are normal. A Gr II/VI soft regurg systolic murmur at the LLSB. A Gr I/VI CORNELL up the LUSB. No DM. No gallop, rub or clicks.   ABD:  Soft. Liver is at the RCM and non-tender or pulsitile.  Normal BSs. EXTS:  Full ROM. No joint pain or swelling. No edema, rashes, cyanosis or bruising.      EXERCISE STRESS TEST:  A modified stress test was performed with no incline.  Baseline shows " atrial sensing and ventricular pacing at 68 bpm. The BP was 104/49 mmHg.  Decreased exercise capacity  (6 mins).  No rhythm disturbance. No T-wave abnormalities. Max  bpm. Normal recovery phase. Final HR 71 bpm.  BP was 103/50 mmHg.                    .....................................................................................           ECG:  Pacemaker rhythm. A-sensed and V paced. V rate is 66 bpm. AZ interval 206 ms. Prolonged AV conduction. Prolonged QRS (172 ms) complexes.             ECHO (PREVIOUS STUDY): No pericardial effusion. Four cardiac chambers. No clots or vegetations.  There may be a defect in the medial (septal) leaflet of the R- AV valve.  The other valves appear relativity normal.  The AV valves seem to lie on the same plane.  No residual ASD or VSD. No RVOT or LVOT obstruction. Pacing wire is seen in the RV, which crosses the R-AV valve. No aortic arch obstruction seen. Standard M-Mode measurements show: LV 3.7 cm.  RV 1.8 cm.  RA 4.4 x 4.0 cm (Area 15.7 cm2).  Ao root 2.4 cm.  LA 3.0 cm.  Sep 0.8 cm.  PW 0.7 cm. EF is at most 60 %. The region of the LV-apex and distal septum contract less well.  Doppler analysis reveals moderate (+) TR, with a jet of 33 mmHg indicating mild elevation in R-sided pressure.  The jet may arise from the previously-mentioned defect in the septal leaflet of the R-AV valve and/or from the pacing wire crossing the valve.  Trivial PI. Peak pressure drop across the pulmonary valve is 3 mmHg. Trivial MR. Trivial AI. Peak pressure drop across the Ao valve is 7 mmHg.  No residual PDA.        ........................................................................................           ASSESSMENT:  A 33 year old with Down Syndrome, (S/P) surgical repair of a VSD and PDA as a child.   I suspect she originally had a variant of an AV Canal. She recently had a transvenous Dual Chamber AV pacemaker placed for severe bradycardia. She presents now still with  "episodes of  " fatigue and weakness. But now with no lightheadedness or near syncope" .  ECGs show a northwest frontal QRS axis indicative of an AV canal. PLAN:  She has several issues that need to be addressed. 1. There is moderate residual R-AV valve regurg, either from a defect in the septal leaflet of the R-AV valve and/or from the pacing wire crossing the valve. However, the RA is not significantly dilated (15.7 cm2).   2.  Her baseline HR is ~ 50 bpm. She may do better with a higher baseline rate, which could lessen ectopy. Obtain a 24 hr Holter to assess rate. 3. Needs TFT followup.  Also CBC and CMP.  4. Would suggest changing the ProAmatine® dosing to 5 mgs           q 8hrs. 5. Consider exercise stress test to assess endurance.  Continue with vitamin supplements  6. Will discuss patient with Cesar Anderson and [facility].   Vazquez Westbrook PhD, MD.  (My cell is )          "

## 2019-01-17 ENCOUNTER — OFFICE VISIT (OUTPATIENT)
Dept: CARDIOLOGY | Facility: CLINIC | Age: 34
End: 2019-01-17
Payer: MEDICARE

## 2019-01-17 ENCOUNTER — CLINICAL SUPPORT (OUTPATIENT)
Dept: CARDIOLOGY | Facility: CLINIC | Age: 34
End: 2019-01-17
Payer: MEDICARE

## 2019-01-17 VITALS
HEART RATE: 86 BPM | WEIGHT: 106.13 LBS | HEIGHT: 56 IN | BODY MASS INDEX: 23.87 KG/M2 | DIASTOLIC BLOOD PRESSURE: 67 MMHG | OXYGEN SATURATION: 99 % | SYSTOLIC BLOOD PRESSURE: 108 MMHG

## 2019-01-17 DIAGNOSIS — Z98.890 H/O HEART SURGERY: ICD-10-CM

## 2019-01-17 DIAGNOSIS — I07.1 TRICUSPID VALVE INSUFFICIENCY, UNSPECIFIED ETIOLOGY: Primary | ICD-10-CM

## 2019-01-17 DIAGNOSIS — Q90.9 DOWN SYNDROME: ICD-10-CM

## 2019-01-17 DIAGNOSIS — Z95.0 CARDIAC PACEMAKER IN SITU: ICD-10-CM

## 2019-01-17 DIAGNOSIS — Z98.890 HISTORY OF HEART SURGERY: ICD-10-CM

## 2019-01-17 DIAGNOSIS — I07.1 TRICUSPID VALVE INSUFFICIENCY: ICD-10-CM

## 2019-01-17 PROCEDURE — 99213 OFFICE O/P EST LOW 20 MIN: CPT | Mod: 25,S$GLB,, | Performed by: PEDIATRICS

## 2019-01-17 PROCEDURE — 93000 ELECTROCARDIOGRAM COMPLETE: CPT | Mod: 59,S$GLB,, | Performed by: PEDIATRICS

## 2019-01-17 PROCEDURE — 93000 PR ELECTROCARDIOGRAM, COMPLETE: ICD-10-PCS | Mod: 59,S$GLB,, | Performed by: PEDIATRICS

## 2019-01-17 PROCEDURE — 99213 PR OFFICE/OUTPT VISIT, EST, LEVL III, 20-29 MIN: ICD-10-PCS | Mod: 25,S$GLB,, | Performed by: PEDIATRICS

## 2019-01-17 PROCEDURE — 93015 CV STRESS TEST SUPVJ I&R: CPT | Mod: S$GLB,,, | Performed by: PEDIATRICS

## 2019-01-17 PROCEDURE — 93015 PR CARDIAC STRESS TST,COMPLETE: ICD-10-PCS | Mod: S$GLB,,, | Performed by: PEDIATRICS

## 2019-01-18 ENCOUNTER — TELEPHONE (OUTPATIENT)
Dept: ENDOCRINOLOGY | Facility: CLINIC | Age: 34
End: 2019-01-18

## 2019-01-18 NOTE — TELEPHONE ENCOUNTER
----- Message from Kayleigh Greenfield sent at 1/18/2019  8:10 AM CST -----  Contact: pt  Pt would like to be called back regarding getting a appt -   Pt can be reached at [phone]

## 2019-01-18 NOTE — TELEPHONE ENCOUNTER
Wants to see only Dr Badillo.  Informed of no openings at this time. Would like message sent to Dr Badillo to see if he can work her in.  Please advise.  Prefers early am time or afternoon time slot

## 2019-02-07 ENCOUNTER — OFFICE VISIT (OUTPATIENT)
Dept: ENDOCRINOLOGY | Facility: CLINIC | Age: 34
End: 2019-02-07
Payer: MEDICARE

## 2019-02-07 VITALS
DIASTOLIC BLOOD PRESSURE: 68 MMHG | WEIGHT: 105.63 LBS | RESPIRATION RATE: 16 BRPM | HEIGHT: 56 IN | HEART RATE: 60 BPM | SYSTOLIC BLOOD PRESSURE: 112 MMHG | BODY MASS INDEX: 23.76 KG/M2

## 2019-02-07 DIAGNOSIS — Q24.9 CONGENITAL HEART DISEASE IN ADULT: ICD-10-CM

## 2019-02-07 DIAGNOSIS — E03.8 SUBCLINICAL HYPOTHYROIDISM: Primary | ICD-10-CM

## 2019-02-07 DIAGNOSIS — Q90.9 DOWN SYNDROME: ICD-10-CM

## 2019-02-07 PROCEDURE — 99213 OFFICE O/P EST LOW 20 MIN: CPT | Mod: S$PBB,,, | Performed by: INTERNAL MEDICINE

## 2019-02-07 PROCEDURE — 99213 OFFICE O/P EST LOW 20 MIN: CPT | Mod: PBBFAC | Performed by: INTERNAL MEDICINE

## 2019-02-07 PROCEDURE — 99213 PR OFFICE/OUTPT VISIT, EST, LEVL III, 20-29 MIN: ICD-10-PCS | Mod: S$PBB,,, | Performed by: INTERNAL MEDICINE

## 2019-02-07 PROCEDURE — 99999 PR PBB SHADOW E&M-EST. PATIENT-LVL III: CPT | Mod: PBBFAC,,, | Performed by: INTERNAL MEDICINE

## 2019-02-07 PROCEDURE — 99999 PR PBB SHADOW E&M-EST. PATIENT-LVL III: ICD-10-PCS | Mod: PBBFAC,,, | Performed by: INTERNAL MEDICINE

## 2019-02-07 NOTE — PROGRESS NOTES
"Subjective:      Chief Complaint: Follow-up for Hypothyroidism    HPI: Kan Matamoros is a 33 y.o. female who is here for follow-up evaluation for hypothyroidism.    She has Down syndrome and has a history of large VSD and a PDA with heart failure, which was repaired when she was 1 years old. She has a pacemaker and is on midodrine for orthostatic hypotension. She was referred last year from Dr. Westbrook due to abnormal TFTs. She was having episodes of fatigue, weakness, lightheadedness and near syncope, for which she underwent tilt-table testing.     Mom reports she has been doing well on the synthroid. She is still tired in the afternoons, but she attributes this to waking up early at ~6AM. She has occasional dizzy spells, but they are less often. She has also gained a little bit of weight, which she thinks is due to eating too much sweets. No reports of cold-intolerance, depression, hair/nail changes. She does get occasional "boils" in the groin. She saw dermatology for this issue and has been using zinc-oxide paste, which seems to be helping.    Current medications:  Brand name Synthroid 50 mcg once daily    Taking vitamin D once daily     Ref. Range 2/12/2018 12:40 4/23/2018 07:40 6/19/2018 06:58 8/3/2018 07:01 12/20/2018 07:03   TSH 0.400 - 4.000 uIU/mL 7.364 (H) 6.059 (H) 3.325 2.989 3.084   Free T4 0.71 - 1.51 ng/dL 0.72 0.85  1.00      Reviewed past medical, family, social history and updated as appropriate.    Review of Systems   Constitutional: Positive for activity change (Fatigued when walking long distances.) and fatigue. Negative for unexpected weight change.   Eyes: Negative for visual disturbance.   Respiratory: Negative for shortness of breath.    Cardiovascular: Negative for chest pain.   Gastrointestinal: Negative for abdominal pain.   Genitourinary: Negative for urgency.   Musculoskeletal: Negative for arthralgias.   Skin: Negative for wound.   Neurological: Negative for headaches.   Hematological: " Does not bruise/bleed easily.   Psychiatric/Behavioral: Negative for sleep disturbance.     Objective:     Vitals:    02/07/19 1423   BP: 112/68   Pulse: 60   Resp: 16       Physical Exam   Constitutional: She is oriented to person, place, and time. She appears well-developed and well-nourished. No distress.   HENT:   Right Ear: External ear normal.   Left Ear: External ear normal.   Nose: Nose normal.   Hearing grossly normal  Dentition grossly normal  Classic facial features of Down syndrome   Eyes: Conjunctivae are normal. Right eye exhibits no discharge. Left eye exhibits no discharge.   Neck: No tracheal deviation present. No thyromegaly present.   Cardiovascular: Normal rate and regular rhythm.   Murmur heard.  PM generator subcutaneously in left chest wall   Pulmonary/Chest: Effort normal and breath sounds normal.   Abdominal: Soft. She exhibits no mass. There is no tenderness.   Musculoskeletal: She exhibits no edema.   Gait Normal  No digital clubbing or extremity cyanosis   Neurological: She is alert and oriented to person, place, and time. Coordination normal.   Skin: No rash noted.   No subcutaneous nodules noted.  Mottled skin in extremities without cyanosis.   Psychiatric: She has a normal mood and affect.   Alert and oriented to person, place, and situation.  Polite, smiling, interactive.   Nursing note and vitals reviewed.      Wt Readings from Last 10 Encounters:   01/17/19 1407 48.1 kg (106 lb 2.4 oz)   08/07/18 1413 48.8 kg (107 lb 7.6 oz)   07/10/18 1425 48.9 kg (107 lb 11.1 oz)   03/06/18 0753 50.1 kg (110 lb 7.2 oz)   02/12/18 1011 49.6 kg (109 lb 5.6 oz)     Lab Results   Component Value Date     02/12/2018    K 4.0 02/12/2018     02/12/2018    CO2 27 02/12/2018    GLU 78 02/12/2018    BUN 13 02/12/2018    CREATININE 0.8 02/12/2018    CALCIUM 10.3 02/12/2018    PROT 7.4 02/12/2018    ALBUMIN 4.0 02/12/2018    BILITOT 0.9 02/12/2018    ALKPHOS 62 02/12/2018    AST 30 02/12/2018     ALT 22 02/12/2018    ANIONGAP 11 02/12/2018    ESTGFRAFRICA >60 02/12/2018    EGFRNONAA >60 02/12/2018    TSH 3.084 12/20/2018        Assessment/Plan:     Congenital heart disease in adult  Avoid exogenous thyrotoxicosis.    Down syndrome  Endocrinopathies associated with Down syndrome include hypothyroidism and diabetes (positively correlated with type 1 diabetes). Screening A1c was normal.    Discussed limiting excess sweets to keep her weight in a healthy range.    Subclinical hypothyroidism  Last TSH is WNL. Clinically euthyroid.    Continue synthroid 50 mcg daily. She is taking it appropriately (mom takes synthroid also and is a patient of mine) first thing in the AM, waiting 30 minutes prior to eating/drinking.    I told her mom she can get her TSH checked yearly through her PCP and send me the results for dose adjustments.    Okay to follow-up with PCP for further yearly monitoring of thyroid function. I told her mom to send me an email if she notices any significant weight changes, worsening fatigue, excessive complaints of feeling cold, hair/nail changes or any other concerns that her thyroid levels should be off so that we can recheck it sooner.    I discussed the case with Dr. Wu and he is in agreement with the plan.

## 2019-02-07 NOTE — ASSESSMENT & PLAN NOTE
Last TSH is WNL. Clinically euthyroid.    Continue synthroid 50 mcg daily. She is taking it appropriately (mom takes synthroid also and is a patient of mine) first thing in the AM, waiting 30 minutes prior to eating/drinking.    I told her mom she can get her TSH checked yearly through her PCP and send me the results for dose adjustments.

## 2019-02-07 NOTE — ASSESSMENT & PLAN NOTE
Endocrinopathies associated with Down syndrome include hypothyroidism and diabetes (positively correlated with type 1 diabetes). Screening A1c was normal.    Discussed limiting excess sweets to keep her weight in a healthy range.

## 2019-05-20 DIAGNOSIS — E03.8 SUBCLINICAL HYPOTHYROIDISM: ICD-10-CM

## 2019-05-20 RX ORDER — LEVOTHYROXINE SODIUM 50 UG/1
50 TABLET ORAL
Qty: 30 TABLET | Refills: 11 | Status: SHIPPED | OUTPATIENT
Start: 2019-05-20 | End: 2020-07-06

## 2019-05-20 NOTE — TELEPHONE ENCOUNTER
----- Message from Shanique Samano sent at 5/20/2019  2:14 PM CDT -----  Contact: [facility]  Called for refill authorization [email or web address].     Can be reached at [phone]    Thanks  KB

## 2019-05-24 DIAGNOSIS — Q24.9 CONGENITAL HEART DISEASE IN ADULT: Primary | ICD-10-CM

## 2019-05-24 DIAGNOSIS — Z95.0 PACEMAKER: ICD-10-CM

## 2019-06-10 NOTE — PROGRESS NOTES
"      HPI:  We had the pleasure of seeing Kan Matamoros. As you may recall, she is a 33 year old  female with Down Syndrome.  She is seen today in the [facility] Congenital [facility], that is held at [facility] in [place], June 13, 2019.  As a baby, this patient was diagnosed to have a large VSD and a PDA with heart failure. She initially was followed by Dr Daniele Carrasco at [facility]. She underwent surgery by Dr Roel Carr at [facility] in May of 1986, at 1 years of age. The operation entailed dacron patch closure of the VSD and ligation of the PDA. Following surgery, her heart failure resolved, and she did well thereafter.  She was lost to follow-up for a number of years.  According to mom, in 2016, Kan presented with a heart rate of ~ 30 bpm (? CHB).  She required placement of a transvenous dual chamber AV pacemaker.       She recently developed episodes of  " fatigue, weakness, lightheadedness and near syncope". As part of her work-up, she underwent a tilt test which, according to mom, was positive, with Kan fainting while upright.  She was started on proamatine, with no real improvement in symptoms.  It appears she had been taking 5 mg 2 times a day*. Additionally, Dr Dunne saw Kan recently and, by downloading the pacemaker, found an 8 beat run of VT.  She was sent to us to investigate if there are any residual cardiac issues associated with her congenital heart disease. The family denies there are problems with:  SOB, CP, abnormally slow or rapid HRs, abdominal or pelvic pain, abnormal bowel movements or dysuria. It should be mentioned that, to mom's recollection, Kan has not had TFTs evaluated; thyroid dysfunction is very common with Down Syndrome, especially in this age group.  She subsequently was seen by Endocrine and found to be hypothyroid.  She was started on synthroid.        Mom reports that Kan still at times has  "weakness and fatigue", but " "there has been an improvement with the thyroid hormone replacement.            * P.S.  The recommended dose of ProAmatine® is 5-10 mgs, "3" times daily. Dosing should take place during the daytime hours when the patient needs to be upright, pursuing the activities of daily living. A suggested dosing schedule of approximately 4-hour intervals is as follows: shortly before, or upon arising in the morning, midday and late afternoon (not later than 6 P.M.). Doses may be given in 3-hour intervals,            ROS:  She uses glasses. Her hearing is normal. No swallowing disorder or GE reflux. No HAs, seizures or syncope.  There is no abdominal pain. No constipation or diarrhea. No pelvic pain.  Normal urination. No DM or bleeding disorder. No arterial disease, HBP or lipid disorder. No known intrinsic problems with the lungs, liver or kidneys. No joint pain or swelling, and full range of motion.  No rashes, cyanosis or clubbing.   No neuromuscular disorder.      PHYSICAL EXAM: Healthy-looking, oriented, 33 year old female with Down Syndrome and in no distress.  WT 45.8 kg. (101 lbs). HR 72 bpm.  Sat 99%.  BP /62 sitting.  Lying down /64 and quickly standing up 102/69.  Good color and perfusion.  HEENT:  Normal facial movements. Normal eye movements. PERR. No bruits over the head. Mucus membranes are moist and pink. No JVD. Neck supple. Thyroid is not enlarged. CHEST: Well-healed mid-line scar.  Normal chest movement with breathing. Good air entry. Clear breath sounds. No wheezing, rhonchi or rales.  HEART:  Normal precordial activity. S1 and S2 are normal. A Gr II/VI soft regurg systolic murmur at the LLSB. A Gr I/VI CORNELL up the LUSB. No DM. No gallop, rub or clicks.   ABD:  Soft. Liver is at the RCM and non-tender or pulsitile.  Normal BSs. EXTS:  Full ROM. No joint pain or swelling. No edema, rashes, cyanosis or bruising.        EXERCISE STRESS TEST (Previous visit):  A modified stress test was performed with " no incline.  Baseline showed atrial sensing and ventricular pacing at 68 bpm. The BP was 104/49 mmHg.  Decreased exercise capacity  (6 mins).  No rhythm disturbance. No T-wave abnormalities. Max  bpm. Normal recovery phase. Final HR 71 bpm.  BP was 103/50 mmHg.                      .....................................................................................           ECG:  Pacemaker rhythm. A-sensed and V paced. V rate is 56 bpm. NE interval 208 ms. Prolonged AV conduction. Prolonged QRS (166 ms) complexes.             ECHO: No pericardial effusion. Four cardiac chambers. No clots or vegetations.  There may be a defect in the medial (septal) leaflet of the R- AV valve.  The other valves appear relativity normal.  The AV valves seem to lie on the same plane.  No residual ASD or VSD. No RVOT or LVOT obstruction. Pacing wire is seen in the RV, which crosses the R-AV valve. No aortic arch obstruction seen. Standard M-Mode measurements show: LV 3.3 cm.  RV 2.1 cm.  RA 4.0 x 4.0 cm (Area 14 cm2).  Ao root 2.7 cm.  LA 2.9 cm.  Sep 0.9 cm.  PW 0.9 cm. EF is 61 %. The region of the LV-apex and distal septum contract less well.  Doppler analysis reveals moderate (+) TR, with a jet of 30 mmHg indicating mild elevation in R-sided pressure.  The jet may arise from the previously-mentioned defect in the septal leaflet of the R-AV valve and/or from the pacing wire crossing the valve.  Trivial PI. Peak pressure drop across the pulmonary valve is 7 mmHg. Trivial MR. Trivial AI. Peak pressure drop across the Ao valve is 3 mmHg.  No residual PDA.        ........................................................................................           ASSESSMENT:  A 33 year old with Down Syndrome, (S/P) surgical repair of a VSD and PDA as a child.   I suspect she originally had a variant of an AV Canal. She recently had a transvenous Dual Chamber AV pacemaker placed for severe bradycardia. She presents now still with  "episodes of  " fatigue and weakness. But now with no lightheadedness or near syncope" .  ECGs show a northwest frontal QRS axis indicative of an AV canal. PLAN:  She has several issues that need to be addressed. 1. There is moderate residual R-AV valve regurg, either from a defect in the septal leaflet of the R-AV valve and/or from the pacing wire crossing the valve. However, the RA is not significantly dilated (15.7 cm2).  2.  Her baseline HR is ~ 50 bpm. It was increased to 60 today. She may do better with a higher baseline rate, which could lessen ectopy.  DOWNLOAD OF PACEMAKER TODAY SHOWED SHORT RUN OF VT.  SHE WILL HAVE AN EPS THROUGH THE PACEMAKER.   3. Needs TFT followup.  Also CBC and CMP.  4. Consider follow-up exercise stress test to assess endurance.  Continue with vitamin supplements.  5. RTC in 6 mos. Vazquez Westbrook PhD, MD.  (My cell is )          "

## 2019-06-13 ENCOUNTER — CLINICAL SUPPORT (OUTPATIENT)
Dept: CARDIOLOGY | Facility: CLINIC | Age: 34
End: 2019-06-13
Payer: MEDICARE

## 2019-06-13 ENCOUNTER — CLINICAL SUPPORT (OUTPATIENT)
Dept: PEDIATRIC CARDIOLOGY | Facility: CLINIC | Age: 34
End: 2019-06-13
Attending: PEDIATRICS
Payer: MEDICARE

## 2019-06-13 ENCOUNTER — OFFICE VISIT (OUTPATIENT)
Dept: CARDIOLOGY | Facility: CLINIC | Age: 34
End: 2019-06-13
Payer: MEDICARE

## 2019-06-13 VITALS
HEART RATE: 72 BPM | DIASTOLIC BLOOD PRESSURE: 62 MMHG | DIASTOLIC BLOOD PRESSURE: 62 MMHG | HEIGHT: 56 IN | SYSTOLIC BLOOD PRESSURE: 102 MMHG | HEIGHT: 56 IN | BODY MASS INDEX: 22.69 KG/M2 | WEIGHT: 100.88 LBS | OXYGEN SATURATION: 98 % | HEART RATE: 72 BPM | SYSTOLIC BLOOD PRESSURE: 102 MMHG | OXYGEN SATURATION: 98 % | WEIGHT: 100.88 LBS | BODY MASS INDEX: 22.69 KG/M2

## 2019-06-13 DIAGNOSIS — E03.8 OTHER SPECIFIED HYPOTHYROIDISM: ICD-10-CM

## 2019-06-13 DIAGNOSIS — Q90.9 DOWN SYNDROME: ICD-10-CM

## 2019-06-13 DIAGNOSIS — I07.1 TRICUSPID VALVE INSUFFICIENCY: ICD-10-CM

## 2019-06-13 DIAGNOSIS — I47.20 VENTRICULAR TACHYCARDIA: ICD-10-CM

## 2019-06-13 DIAGNOSIS — Z98.890 HISTORY OF OPEN HEART SURGERY: ICD-10-CM

## 2019-06-13 DIAGNOSIS — Z95.0 CARDIAC PACEMAKER IN SITU: ICD-10-CM

## 2019-06-13 DIAGNOSIS — Q24.9 CONGENITAL HEART DISEASE IN ADULT: ICD-10-CM

## 2019-06-13 DIAGNOSIS — Z95.0 CARDIAC PACEMAKER IN SITU: Primary | ICD-10-CM

## 2019-06-13 DIAGNOSIS — I07.1 TRICUSPID VALVE INSUFFICIENCY, UNSPECIFIED ETIOLOGY: ICD-10-CM

## 2019-06-13 DIAGNOSIS — Z95.0 PACEMAKER: ICD-10-CM

## 2019-06-13 DIAGNOSIS — Q21.0 VSD (VENTRICULAR SEPTAL DEFECT): ICD-10-CM

## 2019-06-13 DIAGNOSIS — Q24.9 CONGENITAL HEART DISEASE IN ADULT: Primary | ICD-10-CM

## 2019-06-13 PROCEDURE — 93281 CARDIAC DEVICE CHECK - IN CLINIC & HOSPITAL: ICD-10-PCS | Mod: S$GLB,,, | Performed by: PEDIATRICS

## 2019-06-13 PROCEDURE — 99215 PR OFFICE/OUTPT VISIT, EST, LEVL V, 40-54 MIN: ICD-10-PCS | Mod: 25,S$GLB,, | Performed by: PEDIATRICS

## 2019-06-13 PROCEDURE — 93000 PR ELECTROCARDIOGRAM, COMPLETE: ICD-10-PCS | Mod: S$GLB,,, | Performed by: PEDIATRICS

## 2019-06-13 PROCEDURE — 93303 PR ECHO XTHORACIC,CONG A2M,COMPLETE: ICD-10-PCS | Mod: S$GLB,,, | Performed by: PEDIATRICS

## 2019-06-13 PROCEDURE — 99215 OFFICE O/P EST HI 40 MIN: CPT | Mod: 25,S$GLB,, | Performed by: PEDIATRICS

## 2019-06-13 PROCEDURE — 93320 PR DOPPLER ECHO HEART,COMPLETE: ICD-10-PCS | Mod: S$GLB,,, | Performed by: PEDIATRICS

## 2019-06-13 PROCEDURE — 93320 DOPPLER ECHO COMPLETE: CPT | Mod: S$GLB,,, | Performed by: PEDIATRICS

## 2019-06-13 PROCEDURE — 99214 OFFICE O/P EST MOD 30 MIN: CPT | Mod: 25,S$GLB,, | Performed by: PEDIATRICS

## 2019-06-13 PROCEDURE — 93000 ELECTROCARDIOGRAM COMPLETE: CPT | Mod: S$GLB,,, | Performed by: PEDIATRICS

## 2019-06-13 PROCEDURE — 93325 PR DOPPLER COLOR FLOW VELOCITY MAP: ICD-10-PCS | Mod: S$GLB,,, | Performed by: PEDIATRICS

## 2019-06-13 PROCEDURE — 93281 PM DEVICE PROGR EVAL MULTI: CPT | Mod: S$GLB,,, | Performed by: PEDIATRICS

## 2019-06-13 PROCEDURE — 93303 ECHO TRANSTHORACIC: CPT | Mod: S$GLB,,, | Performed by: PEDIATRICS

## 2019-06-13 PROCEDURE — 99214 PR OFFICE/OUTPT VISIT, EST, LEVL IV, 30-39 MIN: ICD-10-PCS | Mod: 25,S$GLB,, | Performed by: PEDIATRICS

## 2019-06-13 PROCEDURE — 93325 DOPPLER ECHO COLOR FLOW MAPG: CPT | Mod: S$GLB,,, | Performed by: PEDIATRICS

## 2019-06-13 NOTE — PROGRESS NOTES
Thank you for referring your patient Kan Matamoros to the electrophysiology clinic for consultation.  Please review my findings below.    CHIEF COMPLAINT: EP evaluation of pacemaker    HISTORY OF PRESENT ILLNESS:   32 y/o female with Down Syndrome, large VSD and PDA with heart failure.  She had surgical repair at 1 years of age.  She had pacemaker placed in 2016 for bradycardia.  She has been followed by Dr. Dunne and was noted to have 8 beat run of VT.  She was found to be   hypothyroid at that time which has been improved.  She denies syncope or near syncope.  She states intermittent heart racing.  She has no difficulty breathing or chest pain.      REVIEW OF SYSTEMS:     GENERAL: No fever, chills, fatigability or weight loss.  SKIN: No rashes, itching or changes in color or texture of skin.  CHEST: Denies SINGER, cyanosis, wheezing, cough and sputum production.  CARDIOVASCULAR:see HPI  ABDOMEN: Appetite fine. No weight loss. Denies diarrhea, abdominal pain, or vomiting.  PERIPHERAL VASCULAR: No claudication or cyanosis.  MUSCULOSKELETAL: No joint stiffness or swelling.   NEUROLOGIC: No history of seizures,  alteration of gait or coordination.    PAST MEDICAL HISTORY:   Past Medical History:   Diagnosis Date    Scoliosis        FAMILY HISTORY:   Family History   Problem Relation Age of Onset    Hypothyroidism Mother     Heart disease Father     Hypertension Father     Stroke Maternal Grandfather     Diabetes Paternal Grandmother     Stroke Paternal Grandmother     Stroke Paternal Grandfather        SOCIAL HISTORY:   Social History     Socioeconomic History    Marital status: Single     Spouse name: Not on file    Number of children: Not on file    Years of education: Not on file    Highest education level: Not on file   Occupational History    Not on file   Social Needs    Financial resource strain: Not on file    Food insecurity:     Worry: Not on file     Inability: Not on file    Transportation  needs:     Medical: Not on file     Non-medical: Not on file   Tobacco Use    Smoking status: Never Smoker    Smokeless tobacco: Never Used   Substance and Sexual Activity    Alcohol use: No    Drug use: No    Sexual activity: Never   Lifestyle    Physical activity:     Days per week: Not on file     Minutes per session: Not on file    Stress: Not on file   Relationships    Social connections:     Talks on phone: Not on file     Gets together: Not on file     Attends Gnosticist service: Not on file     Active member of club or organization: Not on file     Attends meetings of clubs or organizations: Not on file     Relationship status: Not on file   Other Topics Concern    Not on file   Social History Narrative    Not on file       ALLERGIES:  Review of patient's allergies indicates:   Allergen Reactions    Amoxil [amoxicillin]     Augmentin [amoxicillin-pot clavulanate]     Ceclor [cefaclor]     Codeine     Eryped [erythromycin ethylsuccinate]     Morphine     Penicillins     Sulfa (sulfonamide antibiotics)     Vancomycin analogues        MEDICATIONS:    Current Outpatient Medications:     ascorbic acid, vitamin C, (VITAMIN C) 500 MG tablet, Take 500 mg by mouth once daily., Disp: , Rfl:     aspirin (ECOTRIN) 81 MG EC tablet, Take 81 mg by mouth once daily., Disp: , Rfl:     b complex vitamins capsule, Take 1 capsule by mouth once daily., Disp: , Rfl:     cholecalciferol, vitamin D3, (VITAMIN D3) 1,000 unit capsule, Take 1,000 Units by mouth once daily., Disp: , Rfl:     midodrine (PROAMATINE) 5 MG Tab, Take 5 mg by mouth 2 (two) times daily with meals., Disp: , Rfl:     MULTIVIT,CALC,MINS/IRON/FOLIC (ONE-A-DAY WOMENS FORMULA ORAL), Take by mouth., Disp: , Rfl:     SYNTHROID 50 mcg tablet, Take 1 tablet (50 mcg total) by mouth before breakfast., Disp: 30 tablet, Rfl: 11      PHYSICAL EXAM:   Vitals:    06/13/19 0926   BP: 102/62   Pulse: 72   SpO2: 98%   Weight: 45.8 kg (100 lb 13.8 oz)  "  Height: 4' 8" (1.422 m)         GENERAL: Awake, well-developed well-nourished, no apparent distress  HEENT: mucous membranes moist and pink, normocephalic atraumatic, no cranial or carotid bruits, sclera anicteric  NECK: no jugular venous distention, no lymphadenopathy  CHEST: Good air movement, clear to auscultation bilaterally  CARDIOVASCULAR: Quiet precordium, regular rate and rhythm, S1S2, no murmurs rubs or gallops  ABDOMEN: Soft, nontender nondistended, no hepatomegaly, no aortic bruits  EXTREMITIES: Warm well perfused, 2+ radial/pedal pulses, capillary refill 2 seconds, no clubbing, cyanosis, or edema  NEURO: Alert and oriented, cooperative with exam, face symmetric, moves all extremities well    STUDIES:  ECG: Atrial sensing, ventricular pacing with 100% capture.    Pacemaker:   Battery stable, still with nonsustained VT.      ASSESSMENT:  34 y/o female with history of VSD and PDA with pacemaker and nonsustained VT noted on pacemaker.    PLAN:   Schedule NIEPS for VT induction with sedation by anesthesia  Start Metoprolol 12.5mg XL daily (stop 5 days prior to NIEPS).  Further f/u to be determined after NIEPS.  Call for syncope, near syncope, palpitations, chest pain, or any other questions or concerns in the interim.      Time Spent: 50 (min) with over 50% in direct patient and family consultation regarding evaluation and management with history of nonsustained VT on pacemaker.      The patient's doctor will be notified via Epic/FAX    I hope this brings you up-to-date on Kan Saturnino  Please contact me with any questions or concerns.    Igor Olivera MD  Pediatric and Adult Congenital Electrophysiologist  Pediatric Cardiologist       "

## 2019-06-13 NOTE — H&P (VIEW-ONLY)
Thank you for referring your patient Kan Matamoros to the electrophysiology clinic for consultation.  Please review my findings below.    CHIEF COMPLAINT: EP evaluation of pacemaker    HISTORY OF PRESENT ILLNESS:   32 y/o female with large VSD and PDA with heart failure.  She had surgical repair at 1 years of age.  She had pacemaker placed in 2016 for bradycardia.  She has been followed by Dr. Dunne and was noted to have 8 beat run of VT.  She was found to be   hypothyroid at that time which has been improved.  She denies syncope or near syncope.  She states intermittent heart racing.  She has no difficulty breathing or chest pain.      REVIEW OF SYSTEMS:     GENERAL: No fever, chills, fatigability or weight loss.  SKIN: No rashes, itching or changes in color or texture of skin.  CHEST: Denies SINGER, cyanosis, wheezing, cough and sputum production.  CARDIOVASCULAR:see HPI  ABDOMEN: Appetite fine. No weight loss. Denies diarrhea, abdominal pain, or vomiting.  PERIPHERAL VASCULAR: No claudication or cyanosis.  MUSCULOSKELETAL: No joint stiffness or swelling.   NEUROLOGIC: No history of seizures,  alteration of gait or coordination.    PAST MEDICAL HISTORY:   Past Medical History:   Diagnosis Date    Scoliosis        FAMILY HISTORY:   Family History   Problem Relation Age of Onset    Hypothyroidism Mother     Heart disease Father     Hypertension Father     Stroke Maternal Grandfather     Diabetes Paternal Grandmother     Stroke Paternal Grandmother     Stroke Paternal Grandfather        SOCIAL HISTORY:   Social History     Socioeconomic History    Marital status: Single     Spouse name: Not on file    Number of children: Not on file    Years of education: Not on file    Highest education level: Not on file   Occupational History    Not on file   Social Needs    Financial resource strain: Not on file    Food insecurity:     Worry: Not on file     Inability: Not on file    Transportation needs:      Medical: Not on file     Non-medical: Not on file   Tobacco Use    Smoking status: Never Smoker    Smokeless tobacco: Never Used   Substance and Sexual Activity    Alcohol use: No    Drug use: No    Sexual activity: Never   Lifestyle    Physical activity:     Days per week: Not on file     Minutes per session: Not on file    Stress: Not on file   Relationships    Social connections:     Talks on phone: Not on file     Gets together: Not on file     Attends Jainism service: Not on file     Active member of club or organization: Not on file     Attends meetings of clubs or organizations: Not on file     Relationship status: Not on file   Other Topics Concern    Not on file   Social History Narrative    Not on file       ALLERGIES:  Review of patient's allergies indicates:   Allergen Reactions    Amoxil [amoxicillin]     Augmentin [amoxicillin-pot clavulanate]     Ceclor [cefaclor]     Codeine     Eryped [erythromycin ethylsuccinate]     Morphine     Penicillins     Sulfa (sulfonamide antibiotics)     Vancomycin analogues        MEDICATIONS:    Current Outpatient Medications:     ascorbic acid, vitamin C, (VITAMIN C) 500 MG tablet, Take 500 mg by mouth once daily., Disp: , Rfl:     aspirin (ECOTRIN) 81 MG EC tablet, Take 81 mg by mouth once daily., Disp: , Rfl:     b complex vitamins capsule, Take 1 capsule by mouth once daily., Disp: , Rfl:     cholecalciferol, vitamin D3, (VITAMIN D3) 1,000 unit capsule, Take 1,000 Units by mouth once daily., Disp: , Rfl:     midodrine (PROAMATINE) 5 MG Tab, Take 5 mg by mouth 2 (two) times daily with meals., Disp: , Rfl:     MULTIVIT,CALC,MINS/IRON/FOLIC (ONE-A-DAY WOMENS FORMULA ORAL), Take by mouth., Disp: , Rfl:     SYNTHROID 50 mcg tablet, Take 1 tablet (50 mcg total) by mouth before breakfast., Disp: 30 tablet, Rfl: 11      PHYSICAL EXAM:   Vitals:    06/13/19 0926   BP: 102/62   Pulse: 72   SpO2: 98%   Weight: 45.8 kg (100 lb 13.8 oz)   Height: 4'  "8" (1.422 m)         GENERAL: Awake, well-developed well-nourished, no apparent distress  HEENT: mucous membranes moist and pink, normocephalic atraumatic, no cranial or carotid bruits, sclera anicteric  NECK: no jugular venous distention, no lymphadenopathy  CHEST: Good air movement, clear to auscultation bilaterally  CARDIOVASCULAR: Quiet precordium, regular rate and rhythm, S1S2, no murmurs rubs or gallops  ABDOMEN: Soft, nontender nondistended, no hepatomegaly, no aortic bruits  EXTREMITIES: Warm well perfused, 2+ radial/pedal pulses, capillary refill 2 seconds, no clubbing, cyanosis, or edema  NEURO: Alert and oriented, cooperative with exam, face symmetric, moves all extremities well    STUDIES:  ECG: Atrial sensing, ventricular pacing with 100% capture.    Pacemaker:   Battery stable, still with nonsustained VT.      ASSESSMENT:  32 y/o female with history of VSD and PDA with pacemaker and nonsustained VT noted on pacemaker.    PLAN:   Schedule NIEPS for VT induction with sedation by anesthesia  Start Metoprolol 12.5mg XL daily (stop 5 days prior to NIEPS).  Further f/u to be determined after NIEPS.  Call for syncope, near syncope, palpitations, chest pain, or any other questions or concerns in the interim.      Time Spent: 50 (min) with over 50% in direct patient and family consultation regarding evaluation and management with history of nonsustained VT on pacemaker.      The patient's doctor will be notified via Epic/FAX    I hope this brings you up-to-date on Kan Saturnino  Please contact me with any questions or concerns.    Igor Olivera MD  Pediatric and Adult Congenital Electrophysiologist  Pediatric Cardiologist       "

## 2019-06-21 ENCOUNTER — TELEPHONE (OUTPATIENT)
Dept: PEDIATRIC CARDIOLOGY | Facility: CLINIC | Age: 34
End: 2019-06-21

## 2019-06-21 NOTE — TELEPHONE ENCOUNTER
----- Message from Moira Hall sent at 6/21/2019 11:09 AM CDT -----  Contact: Holden watson [phone]  Needs Advice    Reason for call: drinking propel water        Communication Preference: Holden watson [phone]    Additional Information: pt primary doctor was concerned with Pts BP. Told her to drink propel to increase electrolytes. Mom is wondering if this will effect wednesdays procedure. Mom would like a call back

## 2019-06-21 NOTE — TELEPHONE ENCOUNTER
Spoke with mother who states that patient had low BP's and PCP advised her to drink electrolyte water. Informed mom that this was ok, and would no effect EP procedure scheduled on Wednesday. Mom verified that she had stopped metoprolol in preparation for EP study.

## 2019-06-25 ENCOUNTER — TELEPHONE (OUTPATIENT)
Dept: PEDIATRIC CARDIOLOGY | Facility: CLINIC | Age: 34
End: 2019-06-25

## 2019-06-25 NOTE — TELEPHONE ENCOUNTER
Received message that patient's mother had questions regarding procedure on Wednesday 6/26. Called number left in message, no answer, unable to leave message at [phone]. No other numbers in chart

## 2019-06-25 NOTE — TELEPHONE ENCOUNTER
Spoke with mother - answered questions regarding arrival time, and NPO status for procedure scheduled on 6/26. Mom voiced understanding.

## 2019-06-26 ENCOUNTER — ANESTHESIA EVENT (OUTPATIENT)
Dept: MEDSURG UNIT | Facility: HOSPITAL | Age: 34
End: 2019-06-26
Payer: MEDICARE

## 2019-06-26 ENCOUNTER — ANESTHESIA (OUTPATIENT)
Dept: MEDSURG UNIT | Facility: HOSPITAL | Age: 34
End: 2019-06-26
Payer: MEDICARE

## 2019-06-26 ENCOUNTER — HOSPITAL ENCOUNTER (OUTPATIENT)
Facility: HOSPITAL | Age: 34
Discharge: HOME | End: 2019-06-26
Attending: PEDIATRICS | Admitting: PEDIATRICS
Payer: MEDICARE

## 2019-06-26 VITALS
WEIGHT: 98 LBS | OXYGEN SATURATION: 100 % | BODY MASS INDEX: 22.04 KG/M2 | SYSTOLIC BLOOD PRESSURE: 112 MMHG | HEART RATE: 82 BPM | HEIGHT: 56 IN | RESPIRATION RATE: 20 BRPM | TEMPERATURE: 98 F | DIASTOLIC BLOOD PRESSURE: 53 MMHG

## 2019-06-26 DIAGNOSIS — Q90.9 DOWN SYNDROME: ICD-10-CM

## 2019-06-26 DIAGNOSIS — E03.8 SUBCLINICAL HYPOTHYROIDISM: ICD-10-CM

## 2019-06-26 DIAGNOSIS — Q21.0 VSD (VENTRICULAR SEPTAL DEFECT): ICD-10-CM

## 2019-06-26 DIAGNOSIS — Q24.9 CONGENITAL HEART DISEASE IN ADULT: ICD-10-CM

## 2019-06-26 DIAGNOSIS — Z95.0 PACEMAKER: ICD-10-CM

## 2019-06-26 DIAGNOSIS — I47.20 VT (VENTRICULAR TACHYCARDIA): Primary | ICD-10-CM

## 2019-06-26 LAB
ANION GAP SERPL CALC-SCNC: 8 MMOL/L (ref 8–16)
AV DELAY - LONGEST: 200 MSEC
BATTERY VOLTAGE (V): 3.01 V
BUN SERPL-MCNC: 15 MG/DL (ref 6–20)
CALCIUM SERPL-MCNC: 9.5 MG/DL (ref 8.7–10.5)
CHLORIDE SERPL-SCNC: 102 MMOL/L (ref 95–110)
CO2 SERPL-SCNC: 29 MMOL/L (ref 23–29)
CREAT SERPL-MCNC: 0.9 MG/DL (ref 0.5–1.4)
ERI (V): 2.83 V
EST. GFR  (AFRICAN AMERICAN): >60 ML/MIN/1.73 M^2
EST. GFR  (NON AFRICAN AMERICAN): >60 ML/MIN/1.73 M^2
GLUCOSE SERPL-MCNC: 95 MG/DL (ref 70–110)
HCG INTACT+B SERPL-ACNC: <1.2 MIU/ML
IMPEDANCE RA LEAD (NATIVE): 684 OHMS
IMPEDANCE RA LEAD: 513 OHMS
OHS CV DC PP MS1: 0.4 MS
OHS CV DC PP MS2: 0.4 MS
OHS CV DC PP V1: 1.5 V
OHS CV DC PP V2: 1.5 V
P/R-WAVE RA LEAD (NATIVE): NORMAL MV
P/R-WAVE RA LEAD: NORMAL MV
POTASSIUM SERPL-SCNC: 4.2 MMOL/L (ref 3.5–5.1)
PV DELAY - LONGEST: 180 MSEC
SODIUM SERPL-SCNC: 139 MMOL/L (ref 136–145)
THRESHOLD MS RA LEAD (NATIVE): 0.4 MS
THRESHOLD MS RA LEAD: 0.4 MS
THRESHOLD V RA LEAD (NATIVE): 0.5 V
THRESHOLD V RA LEAD: 0.75 V

## 2019-06-26 PROCEDURE — 25000003 PHARM REV CODE 250: Performed by: NURSE ANESTHETIST, CERTIFIED REGISTERED

## 2019-06-26 PROCEDURE — D9220A PRA ANESTHESIA: ICD-10-PCS | Mod: CRNA,,, | Performed by: NURSE ANESTHETIST, CERTIFIED REGISTERED

## 2019-06-26 PROCEDURE — D9220A PRA ANESTHESIA: ICD-10-PCS | Mod: ANES,,, | Performed by: ANESTHESIOLOGY

## 2019-06-26 PROCEDURE — 93642 EP EVL 1/2CHMB TRNSVNS CVDFB: CPT | Mod: 26,,, | Performed by: PEDIATRICS

## 2019-06-26 PROCEDURE — 37000008 HC ANESTHESIA 1ST 15 MINUTES: Performed by: PEDIATRICS

## 2019-06-26 PROCEDURE — 93005 ELECTROCARDIOGRAM TRACING: CPT

## 2019-06-26 PROCEDURE — D9220A PRA ANESTHESIA: Mod: CRNA,,, | Performed by: NURSE ANESTHETIST, CERTIFIED REGISTERED

## 2019-06-26 PROCEDURE — 80048 BASIC METABOLIC PNL TOTAL CA: CPT

## 2019-06-26 PROCEDURE — 93010 ELECTROCARDIOGRAM REPORT: CPT | Mod: ,,, | Performed by: PEDIATRICS

## 2019-06-26 PROCEDURE — 37000009 HC ANESTHESIA EA ADD 15 MINS: Performed by: PEDIATRICS

## 2019-06-26 PROCEDURE — 93642 PR ELECTROPHYS EVAL,CARDIOVERTER: ICD-10-PCS | Mod: 26,,, | Performed by: PEDIATRICS

## 2019-06-26 PROCEDURE — 93642 EP EVL 1/2CHMB TRNSVNS CVDFB: CPT | Performed by: PEDIATRICS

## 2019-06-26 PROCEDURE — 84702 CHORIONIC GONADOTROPIN TEST: CPT

## 2019-06-26 PROCEDURE — 93010 EKG 12-LEAD: ICD-10-PCS | Mod: ,,, | Performed by: PEDIATRICS

## 2019-06-26 PROCEDURE — 63600175 PHARM REV CODE 636 W HCPCS: Performed by: NURSE ANESTHETIST, CERTIFIED REGISTERED

## 2019-06-26 PROCEDURE — D9220A PRA ANESTHESIA: Mod: ANES,,, | Performed by: ANESTHESIOLOGY

## 2019-06-26 RX ORDER — SODIUM CHLORIDE 9 MG/ML
INJECTION, SOLUTION INTRAVENOUS CONTINUOUS PRN
Status: DISCONTINUED | OUTPATIENT
Start: 2019-06-26 | End: 2019-06-26

## 2019-06-26 RX ORDER — PROPOFOL 10 MG/ML
VIAL (ML) INTRAVENOUS CONTINUOUS PRN
Status: DISCONTINUED | OUTPATIENT
Start: 2019-06-26 | End: 2019-06-26

## 2019-06-26 RX ORDER — MIDAZOLAM HYDROCHLORIDE 1 MG/ML
INJECTION, SOLUTION INTRAMUSCULAR; INTRAVENOUS
Status: DISCONTINUED | OUTPATIENT
Start: 2019-06-26 | End: 2019-06-26

## 2019-06-26 RX ORDER — SODIUM CHLORIDE 0.9 % (FLUSH) 0.9 %
10 SYRINGE (ML) INJECTION
Status: DISCONTINUED | OUTPATIENT
Start: 2019-06-26 | End: 2019-06-26 | Stop reason: HOSPADM

## 2019-06-26 RX ORDER — PROPOFOL 10 MG/ML
VIAL (ML) INTRAVENOUS
Status: DISCONTINUED | OUTPATIENT
Start: 2019-06-26 | End: 2019-06-26

## 2019-06-26 RX ORDER — FENTANYL CITRATE 50 UG/ML
25 INJECTION, SOLUTION INTRAMUSCULAR; INTRAVENOUS EVERY 5 MIN PRN
Status: DISCONTINUED | OUTPATIENT
Start: 2019-06-26 | End: 2019-06-26 | Stop reason: HOSPADM

## 2019-06-26 RX ORDER — EPHEDRINE SULFATE 50 MG/ML
INJECTION, SOLUTION INTRAVENOUS
Status: DISCONTINUED | OUTPATIENT
Start: 2019-06-26 | End: 2019-06-26

## 2019-06-26 RX ORDER — PHENYLEPHRINE HYDROCHLORIDE 10 MG/ML
INJECTION INTRAVENOUS
Status: DISCONTINUED | OUTPATIENT
Start: 2019-06-26 | End: 2019-06-26

## 2019-06-26 RX ORDER — METOPROLOL TARTRATE 25 MG/1
25 TABLET, FILM COATED ORAL 2 TIMES DAILY
COMMUNITY
End: 2020-12-28

## 2019-06-26 RX ADMIN — PHENYLEPHRINE HYDROCHLORIDE 100 MCG: 10 INJECTION INTRAVENOUS at 10:06

## 2019-06-26 RX ADMIN — EPHEDRINE SULFATE 10 MG: 50 INJECTION, SOLUTION INTRAMUSCULAR; INTRAVENOUS; SUBCUTANEOUS at 10:06

## 2019-06-26 RX ADMIN — PROPOFOL 40 MG: 10 INJECTION, EMULSION INTRAVENOUS at 09:06

## 2019-06-26 RX ADMIN — SODIUM CHLORIDE: 9 INJECTION, SOLUTION INTRAVENOUS at 09:06

## 2019-06-26 RX ADMIN — PHENYLEPHRINE HYDROCHLORIDE 200 MCG: 10 INJECTION INTRAVENOUS at 10:06

## 2019-06-26 RX ADMIN — PROPOFOL 150 MCG/KG/MIN: 10 INJECTION, EMULSION INTRAVENOUS at 09:06

## 2019-06-26 RX ADMIN — EPHEDRINE SULFATE 5 MG: 50 INJECTION, SOLUTION INTRAMUSCULAR; INTRAVENOUS; SUBCUTANEOUS at 10:06

## 2019-06-26 RX ADMIN — ISOPROTERENOL HYDROCHLORIDE 4 MCG/MIN: 0.2 INJECTION, SOLUTION INTRAMUSCULAR; INTRAVENOUS at 10:06

## 2019-06-26 RX ADMIN — MIDAZOLAM 2 MG: 1 INJECTION INTRAMUSCULAR; INTRAVENOUS at 09:06

## 2019-06-26 RX ADMIN — PHENYLEPHRINE HYDROCHLORIDE 50 MCG: 10 INJECTION INTRAVENOUS at 10:06

## 2019-06-26 RX ADMIN — PHENYLEPHRINE HYDROCHLORIDE 150 MCG: 10 INJECTION INTRAVENOUS at 10:06

## 2019-06-26 NOTE — PLAN OF CARE
Problem: Adult Inpatient Plan of Care  Goal: Plan of Care Review  Outcome: Ongoing (interventions implemented as appropriate)  Patient arrived to room. PIV placed, hcg and BMP sent. Admit assessment completed. Plan of care discussed with patient. Will monitor

## 2019-06-26 NOTE — PLAN OF CARE
Problem: Adult Inpatient Plan of Care  Goal: Plan of Care Review  Outcome: Ongoing (interventions implemented as appropriate)  Received report from SUSANNA Orozco. Patient s/p NIEPS, AAOx3. VSS, no c/o pain or discomfort at this time, resp even and unlabored. Post procedure protocol reviewed with patient and patient's family. Understanding verbalized. Family members at bedside. Nurse call bell within reach. Will continue to monitor per post procedure protocol.

## 2019-06-26 NOTE — INTERVAL H&P NOTE
The patient has been examined and the H&P has been reviewed:    I concur with the findings and no changes have occurred since H&P was written.    Anesthesia/Surgery risks, benefits and alternative options discussed and understood by patient/family.          Active Hospital Problems    Diagnosis  POA    VT (ventricular tachycardia) [I47.2]  Yes      Resolved Hospital Problems   No resolved problems to display.     Plan:   Non-invasive VT induction study and possible venogram with sedation by anesthesia.    Igor Olivera MD  Pediatric and Adult Congenital Electrophysiologist  Pediatric Cardiologist

## 2019-06-26 NOTE — Clinical Note
Patient has existing Dual PPM that will be interrogated. ADVISA  MRI [id number], DOI 11/17/16, SN [id number], DDD,

## 2019-06-26 NOTE — ANESTHESIA POSTPROCEDURE EVALUATION
Anesthesia Post Evaluation    Patient: Kan Matamoros    Procedure(s) Performed: Procedure(s) (LRB):  CARDIAC ELECTROPHYSIOLOGY STUDY, NONINVASIVE (N/A)    Final Anesthesia Type: general  Patient location during evaluation: PACU  Patient participation: Yes- Able to Participate  Level of consciousness: awake and alert  Post-procedure vital signs: reviewed and stable  Pain management: adequate  Airway patency: patent  PONV status at discharge: No PONV  Anesthetic complications: no      Cardiovascular status: blood pressure returned to baseline  Respiratory status: unassisted, spontaneous ventilation and room air  Hydration status: euvolemic  Follow-up not needed.          Vitals Value Taken Time   /53 6/26/2019 11:45 AM   Temp 36.7 °C (98 °F) 6/26/2019 11:45 AM   Pulse 82 6/26/2019 11:45 AM   Resp 20 6/26/2019 11:45 AM   SpO2 100 % 6/26/2019 11:45 AM         Event Time     Out of Recovery 11:26:00          Pain/Larissa Score: Larissa Score: 10 (6/26/2019 11:23 AM)

## 2019-06-26 NOTE — ANESTHESIA PREPROCEDURE EVALUATION
06/26/2019  Kan Matamoros is a 33 y.o., female.    Anesthesia Evaluation    I have reviewed the Patient Summary Reports.    I have reviewed the Nursing Notes.   I have reviewed the Medications.     Review of Systems  Anesthesia Hx:  No problems with previous Anesthesia  History of prior surgery of interest to airway management or planning: Denies Family Hx of Anesthesia complications.   Denies Personal Hx of Anesthesia complications.   Social:  Non-Smoker    Hematology/Oncology:  Hematology Normal   Oncology Normal     EENT/Dental:EENT/Dental Normal   Cardiovascular:   VSD sp repair, VT, pacemaker, cardiology notes and studies reviewed   Pulmonary:  Pulmonary Normal    Renal/:  Renal/ Normal     Hepatic/GI:  Hepatic/GI Normal    Musculoskeletal:   Sp scoliosis surgery, mother denies cervical spine instability Spine Disorders:    OB/GYN/PEDS:  Trisomy 21   Neurological:  Neurology Normal    Endocrine:   Hypothyroidism    Psych:  Psychiatric Normal           Physical Exam  General:  Well nourished    Airway/Jaw/Neck:  Airway Findings: Mouth Opening: Normal Tongue: Normal  General Airway Assessment: Adult  Mallampati: I  TM Distance: 4 - 6 cm  Jaw/Neck Findings:  Neck ROM: Normal ROM      Dental:  Dental Findings: Upper Dentures, Lower Dentures   Chest/Lungs:  Chest/Lungs Findings: Clear to auscultation, Normal Respiratory Rate     Heart/Vascular:  Heart Findings: Rate: Normal  Rhythm: Regular Rhythm  Sounds: Normal        Mental Status:  Mental Status Findings:  Cooperative, Alert and Oriented         Anesthesia Plan  Type of Anesthesia, risks & benefits discussed:  Anesthesia Type:  general, MAC  Patient's Preference:   Intra-op Monitoring Plan: standard ASA monitors  Intra-op Monitoring Plan Comments:   Post Op Pain Control Plan: multimodal analgesia  Post Op Pain Control Plan Comments:   Induction:    IV  Beta Blocker:  Patient is not currently on a Beta-Blocker (No further documentation required).       Informed Consent: Patient representative understands risks and agrees with Anesthesia plan.  Questions answered. Anesthesia consent signed with patient representative.  ASA Score: 3     Day of Surgery Review of History & Physical:    H&P update referred to the provider.         Ready For Surgery From Anesthesia Perspective.

## 2019-06-26 NOTE — TRANSFER OF CARE
"Anesthesia Transfer of Care Note    Patient: Kan Matamoros    Procedure(s) Performed: Procedure(s) (LRB):  CARDIAC ELECTROPHYSIOLOGY STUDY, NONINVASIVE (N/A)    Patient location: PACU    Anesthesia Type: general    Transport from OR: Transported from OR on room air with adequate spontaneous ventilation    Post pain: adequate analgesia    Post assessment: no apparent anesthetic complications and tolerated procedure well    Post vital signs: stable    Level of consciousness: awake, alert and oriented    Nausea/Vomiting: no nausea/vomiting    Complications: none    Transfer of care protocol was followed      Last vitals:   Visit Vitals  /60 (BP Location: Left arm, Patient Position: Lying)   Pulse 89   Temp 35.9 °C (96.6 °F) (Oral)   Resp 20   Ht 4' 8" (1.422 m)   Wt 44.5 kg (98 lb)   SpO2 98%   Breastfeeding? No   BMI 21.97 kg/m²     "

## 2019-06-26 NOTE — PROGRESS NOTES
Patient discharged per MD orders. Instructions given on medications, wound care, activity, signs of infection, when to call MD, and follow up appointments. Pt verbalized understanding.  Patient AAOx3, VSS, no c/o pain or discomfort at this time. PIV removed. Patient left unit via wheelchair with family.

## 2019-06-28 ENCOUNTER — CONFERENCE (OUTPATIENT)
Dept: PEDIATRIC CARDIOLOGY | Facility: CLINIC | Age: 34
End: 2019-06-28

## 2019-06-28 NOTE — PROGRESS NOTES
Kan Matamoros underwent  EP study  on 06/26/2019 .Her case was reviewed in our [facility] on 06/28/2019. She should follow up with Dr. Westbrook.

## 2019-07-01 NOTE — DISCHARGE SUMMARY
[facility]  Discharge Note  Short Stay    Admit Date: 6/26/2019    Discharge Date and Time: 6/26/2019 12:01 PM     Attending Physician: Igor Olivera    Discharge Provider: Igor Olivera    Diagnoses:  Active Hospital Problems    Diagnosis  POA    VT (ventricular tachycardia) [I47.2]  Yes    Subclinical hypothyroidism [E03.9]  Yes      Resolved Hospital Problems   No resolved problems to display.       Discharged Condition: good    Hospital Course: Patient was admitted for an outpatient procedure and tolerated the procedure well with no complications.    Final Diagnoses: Same as principal problem.    Disposition: Home or Self Care    Follow up/Patient Instructions:    Medications:  Reconciled Home Medications:      Medication List      CONTINUE taking these medications    aspirin 81 MG EC tablet  Commonly known as:  ECOTRIN  Take 81 mg by mouth once daily.     metoprolol tartrate 25 MG tablet  Commonly known as:  LOPRESSOR  Take 25 mg by mouth 2 (two) times daily. 1/2 tablet once daily     midodrine 5 MG Tab  Commonly known as:  PROAMATINE  Take 5 mg by mouth 2 (two) times daily with meals.     ONE-A-DAY WOMENS FORMULA ORAL  Take by mouth.     SYNTHROID 50 MCG tablet  Generic drug:  levothyroxine  Take 1 tablet (50 mcg total) by mouth before breakfast.     VITAMIN C 500 MG tablet  Generic drug:  ascorbic acid (vitamin C)  Take 500 mg by mouth once daily.     VITAMIN D3 1,000 unit capsule  Generic drug:  cholecalciferol (vitamin D3)  Take 1,000 Units by mouth once daily.        ASK your doctor about these medications    b complex vitamins capsule  Take 1 capsule by mouth once daily.          Discharge Procedure Orders   Diet general     Call MD for:  temperature >100.4     Call MD for:  persistent nausea and vomiting     Call MD for:  severe uncontrolled pain     Call MD for:  difficulty breathing, headache or visual disturbances     Call MD for:  redness, tenderness, or signs of infection (pain,  swelling, redness, odor or green/yellow discharge around incision site)     Call MD for:  hives     Call MD for:  persistent dizziness or light-headedness     Call MD for:  extreme fatigue     Call MD for:   Order Comments: Syncope, near syncope, or any other questions or concerns in the interim.     Activity as tolerated     Follow-up Information     Vazquez Westbrook MD In 1 month.    Specialty:  Pediatric Cardiology  Contact information:  [address] [id number] [phone]                 I have reviewed the above discharge instructions and plan at length with patient and family.  Their questions were answered and they expressed understanding.    Igor Olivera MD  Pediatric and Adult Congenital Electrophysiologist  Pediatric Cardiologist

## 2019-09-13 ENCOUNTER — TELEPHONE (OUTPATIENT)
Dept: PEDIATRIC CARDIOLOGY | Facility: CLINIC | Age: 34
End: 2019-09-13

## 2019-09-13 NOTE — TELEPHONE ENCOUNTER
Spoke with mother who had questions regarding appointment slip for home monitor check. Explained reason for appointment and explained to send home transmission on 9/23. Mom voiced understanding.

## 2019-09-13 NOTE — TELEPHONE ENCOUNTER
----- Message from Jenelle Graham sent at 9/13/2019 10:15 AM CDT -----  Contact: Mom   Type:  Needs Medical Advice    Who Called: Mom       Would the patient rather a call back or a response via [facility]? Call back     Best Call Back Number: [phone]    Additional Information: Mom is requesting to speak with the nurse about the pt upcoming appt.

## 2019-09-23 ENCOUNTER — CLINICAL SUPPORT (OUTPATIENT)
Dept: PEDIATRIC CARDIOLOGY | Facility: CLINIC | Age: 34
End: 2019-09-23
Attending: PEDIATRICS
Payer: MEDICARE

## 2019-09-23 DIAGNOSIS — Z95.0 PACEMAKER: ICD-10-CM

## 2019-09-23 DIAGNOSIS — Q24.9 CONGENITAL HEART DISEASE IN ADULT: ICD-10-CM

## 2019-09-23 PROCEDURE — 93294 REM INTERROG EVL PM/LDLS PM: CPT | Mod: ,,, | Performed by: PEDIATRICS

## 2019-09-23 PROCEDURE — 93296 REM INTERROG EVL PM/IDS: CPT | Mod: PBBFAC | Performed by: PEDIATRICS

## 2019-09-23 PROCEDURE — 93294 CV PACEMAKER REMOTE PEDIATRICS (CUPID ONLY): ICD-10-PCS | Mod: ,,, | Performed by: PEDIATRICS

## 2019-09-30 LAB
AV DELAY - LONGEST: 200 MSEC
BATTERY VOLTAGE (V): 3.01 V
ERI (V): 2.83 V
IMPEDANCE RA LEAD (NATIVE): 646 OHMS
IMPEDANCE RA LEAD: 513 OHMS
OHS CV DC PP MS1: 0.4 MS
OHS CV DC PP MS2: 0.4 MS
OHS CV DC PP V1: 1.5 V
OHS CV DC PP V2: 1.5 V
P/R-WAVE RA LEAD (NATIVE): 6.1 MV
P/R-WAVE RA LEAD: 3 MV
PV DELAY - LONGEST: 180 MSEC
THRESHOLD MS RA LEAD (NATIVE): 0.4 MS
THRESHOLD MS RA LEAD: 0.4 MS
THRESHOLD V RA LEAD (NATIVE): 0.38 V
THRESHOLD V RA LEAD: 0.62 V

## 2019-11-13 DIAGNOSIS — I47.20 VT (VENTRICULAR TACHYCARDIA): ICD-10-CM

## 2019-11-13 DIAGNOSIS — Q24.9 CONGENITAL HEART DISEASE IN ADULT: Primary | ICD-10-CM

## 2019-11-13 DIAGNOSIS — Z95.0 PACEMAKER: ICD-10-CM

## 2019-11-13 DIAGNOSIS — Q21.0 VSD (VENTRICULAR SEPTAL DEFECT): ICD-10-CM

## 2019-12-27 ENCOUNTER — TELEPHONE (OUTPATIENT)
Dept: PEDIATRIC CARDIOLOGY | Facility: CLINIC | Age: 34
End: 2019-12-27

## 2019-12-30 ENCOUNTER — CLINICAL SUPPORT (OUTPATIENT)
Dept: PEDIATRIC CARDIOLOGY | Facility: CLINIC | Age: 34
End: 2019-12-30
Attending: PEDIATRICS
Payer: MEDICARE

## 2019-12-30 DIAGNOSIS — Q24.9 CONGENITAL HEART DISEASE IN ADULT: ICD-10-CM

## 2019-12-30 DIAGNOSIS — I47.20 VT (VENTRICULAR TACHYCARDIA): ICD-10-CM

## 2019-12-30 DIAGNOSIS — Q21.0 VSD (VENTRICULAR SEPTAL DEFECT): ICD-10-CM

## 2019-12-30 DIAGNOSIS — Z95.0 PACEMAKER: ICD-10-CM

## 2019-12-30 LAB
AV DELAY - LONGEST: 200 MSEC
BATTERY VOLTAGE (V): 3.01 V
ERI (V): 2.83 V
IMPEDANCE RA LEAD (NATIVE): 665 OHMS
IMPEDANCE RA LEAD: 513 OHMS
OHS CV DC PP MS1: 0.4 MS
OHS CV DC PP MS2: 0.4 MS
OHS CV DC PP V1: 1.5 V
OHS CV DC PP V2: 1.5 V
P/R-WAVE RA LEAD (NATIVE): 6.5 MV
P/R-WAVE RA LEAD: 2.9 MV
PV DELAY - LONGEST: 180 MSEC
THRESHOLD MS RA LEAD (NATIVE): 0.4 MS
THRESHOLD MS RA LEAD: 0.4 MS
THRESHOLD V RA LEAD (NATIVE): 0.5 V
THRESHOLD V RA LEAD: 0.62 V

## 2019-12-30 PROCEDURE — 93294 REM INTERROG EVL PM/LDLS PM: CPT | Mod: ,,, | Performed by: PEDIATRICS

## 2019-12-30 PROCEDURE — 93294 CV PACEMAKER REMOTE PEDIATRICS (CUPID ONLY): ICD-10-PCS | Mod: ,,, | Performed by: PEDIATRICS

## 2019-12-30 PROCEDURE — 93296 REM INTERROG EVL PM/IDS: CPT | Mod: PBBFAC | Performed by: PEDIATRICS

## 2020-01-08 ENCOUNTER — TELEPHONE (OUTPATIENT)
Dept: PEDIATRIC CARDIOLOGY | Facility: CLINIC | Age: 35
End: 2020-01-08

## 2020-01-08 NOTE — TELEPHONE ENCOUNTER
Spoke with Kan Webber  regarding device followup. [facility] will no longer be doing a clinic at Dr Westbrook's office. We would love to take care of you and service your device, but we will need to set you up an appointment in [facility] clinic with Dr Mcleod to continue REMOTE monitoring. Please let us know if you would like to schedule an appointment or speak with Dr Westbrook to see who they would like you follow up with. Please give us a call back to let us know what you choose to do. We will dis-enroll you from our  REMOTE device service if you choose to follow up elsewhere.

## 2020-02-01 NOTE — PROGRESS NOTES
"      HPI:  We had the pleasure of seeing Kan Matamoros. As you may recall, she is a 34 year old  female with Down Syndrome.  She is seen today in the [facility] Ad[facility] with Congenital Heart Disease, that is held at [facility] in [place], Feb 3, 2020.  As a baby, this patient was diagnosed to have a large VSD and a PDA with heart failure. She initially was followed by Dr Daniele Carrasco at [facility]. She underwent surgery by Dr Roel Carr at [facility] in May of 1986, at 1 years of age. The operation entailed dacron patch closure of the VSD and ligation of the PDA. Following surgery, her heart failure resolved, and she did well thereafter.  She was lost to follow-up for a number of years.  According to mom, in 2016, Kan presented with a heart rate of ~ 30 bpm (? CHB).  She required placement of a transvenous dual chamber AV pacemaker.       She recently developed episodes of  " fatigue, weakness, lightheadedness and near syncope". As part of her work-up, she underwent a tilt test which, according to mom, was positive, with Kan fainting while upright.  She was started on proamatine, with no real improvement in symptoms.  It appears she had been taking 5 mg 2 times a day*. Additionally, the late Dr Dunne saw Kan recently and, by downloading the pacemaker, found an 8 beat run of VT.  She was sent to us to investigate if there are any residual cardiac issues associated with her congenital heart disease. The family denies there are problems with:  SOB, CP, abnormally rapid HRs, abdominal or pelvic pain, abnormal bowel movements or dysuria.  She was recently found to be hypothyroid and was started on synthroid.  She comes today to download the pacemaker because of mom's concern of heart rates in the 40s at night.                   * P.S.  The recommended dose of ProAmatine® is 5-10 mgs, "3" times daily. Dosing should take place during the daytime hours when the patient needs to be " upright, pursuing the activities of daily living. A suggested dosing schedule of approximately 4-hour intervals is as follows: shortly before, or upon arising in the morning, midday and late afternoon (not later than 6 P.M.). Doses may be given in 3-hour intervals,            ROS:  She uses glasses. Her hearing is normal. No swallowing disorder or GE reflux. No HAs, seizures or syncope.  There is no abdominal pain. No constipation or diarrhea. No pelvic pain.  Normal urination. No DM or bleeding disorder. No arterial disease, HBP or lipid disorder. No known intrinsic problems with the lungs, liver or kidneys. No joint pain or swelling, and full range of motion.  No rashes, cyanosis or clubbing. No neuromuscular disorder.        PHYSICAL EXAM: Healthy-looking, oriented, 34 year old female with Down Syndrome and in no distress.  WT 45.7 kg. (100 lbs 12 ozs). HR 60 bpm.  Sat 99%.  BP LA 96/61 sitting.  /64 standing up. Good color and perfusion.  HEENT:  Normal facial movements. Normal eye movements. PERR. No bruits over the head. Mucus membranes are moist and pink. No JVD. Neck supple. Thyroid is not enlarged. CHEST: Well-healed mid-line scar.  Normal chest movement with breathing. Good air entry. Clear breath sounds. No wheezing, rhonchi or rales.  HEART:  Normal precordial activity. S1 and S2 are normal. A Gr II/VI soft regurg systolic murmur at the LLSB. A Gr I/VI CORNELL up the LUSB. No DM. No gallop, rub or clicks.   ABD:  Soft. Liver is at the RCM and non-tender or pulsitile.  Normal BSs. EXTS:  Full ROM. No joint pain or swelling. No edema, rashes, cyanosis or bruising.        EXERCISE STRESS TEST (Previous visit):  A modified stress test was performed with no incline.  Baseline showed atrial sensing and ventricular pacing at 68 bpm. The BP was 104/49 mmHg.  Decreased exercise capacity  (6 mins).  No rhythm disturbance. No T-wave abnormalities. Max  bpm. Normal recovery phase. Final HR 71 bpm.  BP was  103/50 mmHg.                      .....................................................................................           ECG:  Pacemaker rhythm. A-V dual-paced rhythm. V rate is 60 bpm. DC interval 204 ms. QRS (188 ms).  QTc 526 ms.          DOWNLOAD OF PACEMAKER:   No rhythm disturbances from 12/30/19 to present 2/3/20, except for a few isolated PVCs.  The pacemaker was changed from DDD to DDDR (rate response), to help avoid a few rates or 40 bpm associated with PVCs at night. (Recall between 6/26/19 to 12/30/19 there were a few short episodes of non-sustained VT and SVT.  These have gone away by adding Metoprolol ER 25 mg qd) .                       ECHO (Previous visit) : No pericardial effusion. Four cardiac chambers. No clots or vegetations.  There may be a defect in the medial (septal) leaflet of the R- AV valve.  The other valves appear relativity normal.  The AV valves seem to lie on the same plane.  No residual ASD or VSD. No RVOT or LVOT obstruction. Pacing wire is seen in the RV, which crosses the R-AV valve. No aortic arch obstruction seen. Standard M-Mode measurements show: LV 3.3 cm.  RV 2.1 cm.  RA 4.0 x 4.0 cm (Area 14 cm2).  Ao root 2.7 cm.  LA 2.9 cm.  Sep 0.9 cm.  PW 0.9 cm. EF is 61 %. The region of the LV-apex and distal septum contract less well.  Doppler analysis reveals moderate (+) TR, with a jet of 30 mmHg indicating mild elevation in R-sided pressure.  The jet may arise from the previously-mentioned defect in the septal leaflet of the R-AV valve and/or from the pacing wire crossing the valve.  Trivial PI. Peak pressure drop across the pulmonary valve is 7 mmHg. Trivial MR. Trivial AI. Peak pressure drop across the Ao valve is 3 mmHg.  No residual PDA.        ........................................................................................           ASSESSMENT:  A 34 year old with Down Syndrome, (S/P) surgical repair of a VSD and PDA as a child.   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 She recently had a transvenous Dual Chamber AV pacemaker placed for severe bradycardia. She presents now still with occasional episodes of  " fatigue and weakness, but no lightheadedness or near syncope" .  PLAN:  She has several issues that need to be addressed. 1. There is moderate residual R-AV valve regurg, either from a defect in the septal leaflet of the R-AV valve and/or from the pacing wire crossing the valve. However, the RA is not significantly dilated (15.7 cm2).  2.  Her baseline HR was ~ 50 bpm.  In the pacemaker, it was increased to 60, which, according to mom, has helped. DOWNLOAD OF PACEMAKER TODAY SHOWED NO EVENTS FROM 12/30/19 to 2/ 3/ 20, except for a few isolated PVCs.    3. Needs TFT followup.  Also CBC and CMP.  4. Consider follow-up exercise stress test to assess endurance.  Continue with vitamin supplements.  5. RTC in 6 mos. Vazquez Westbrook PhD, MD.  (My cell is )      CURRENT MEDICATIONS:    Vitamin C 500 mg qd.   B Complex 50 qd.    Vitamin D3 1000 U.  Synthroid 50 micrograms qd.  ASA 81 mg qd.  Metoprolol Succinate 25 mg qAM.  Midodrine has been DCed.                            "

## 2020-02-03 ENCOUNTER — OFFICE VISIT (OUTPATIENT)
Dept: CARDIOLOGY | Facility: CLINIC | Age: 35
End: 2020-02-03
Payer: MEDICARE

## 2020-02-03 VITALS
DIASTOLIC BLOOD PRESSURE: 61 MMHG | WEIGHT: 100.75 LBS | BODY MASS INDEX: 22.66 KG/M2 | SYSTOLIC BLOOD PRESSURE: 96 MMHG | HEART RATE: 60 BPM | OXYGEN SATURATION: 99 % | HEIGHT: 56 IN

## 2020-02-03 DIAGNOSIS — Z98.890 HISTORY OF OPEN HEART SURGERY: ICD-10-CM

## 2020-02-03 DIAGNOSIS — I49.5 SINUS NODE DYSFUNCTION: Primary | ICD-10-CM

## 2020-02-03 DIAGNOSIS — Z95.0 CARDIAC PACEMAKER IN SITU: ICD-10-CM

## 2020-02-03 DIAGNOSIS — Q90.9 DOWN SYNDROME: ICD-10-CM

## 2020-02-03 PROCEDURE — 93000 PR ELECTROCARDIOGRAM, COMPLETE: ICD-10-PCS | Mod: 59,S$GLB,, | Performed by: PEDIATRICS

## 2020-02-03 PROCEDURE — 93288 PR INTERROG EVAL, IN PERSON,PACEMAKER: ICD-10-PCS | Mod: S$GLB,,, | Performed by: PEDIATRICS

## 2020-02-03 PROCEDURE — 93288 INTERROG EVL PM/LDLS PM IP: CPT | Mod: S$GLB,,, | Performed by: PEDIATRICS

## 2020-02-03 PROCEDURE — 99213 PR OFFICE/OUTPT VISIT, EST, LEVL III, 20-29 MIN: ICD-10-PCS | Mod: 25,S$GLB,, | Performed by: PEDIATRICS

## 2020-02-03 PROCEDURE — 93000 ELECTROCARDIOGRAM COMPLETE: CPT | Mod: 59,S$GLB,, | Performed by: PEDIATRICS

## 2020-02-03 PROCEDURE — 99213 OFFICE O/P EST LOW 20 MIN: CPT | Mod: 25,S$GLB,, | Performed by: PEDIATRICS

## 2020-03-30 ENCOUNTER — CLINICAL SUPPORT (OUTPATIENT)
Dept: PEDIATRIC CARDIOLOGY | Facility: CLINIC | Age: 35
End: 2020-03-30
Attending: PEDIATRICS
Payer: MEDICARE

## 2020-03-30 DIAGNOSIS — Z95.0 PACEMAKER: ICD-10-CM

## 2020-03-30 DIAGNOSIS — Q24.9 CONGENITAL HEART DISEASE IN ADULT: ICD-10-CM

## 2020-03-30 DIAGNOSIS — I47.20 VT (VENTRICULAR TACHYCARDIA): ICD-10-CM

## 2020-03-30 DIAGNOSIS — Q21.0 VSD (VENTRICULAR SEPTAL DEFECT): ICD-10-CM

## 2020-03-30 PROCEDURE — 93294 CV PACEMAKER REMOTE PEDIATRICS (CUPID ONLY): ICD-10-PCS | Mod: ,,, | Performed by: PEDIATRICS

## 2020-03-30 PROCEDURE — 93296 REM INTERROG EVL PM/IDS: CPT | Mod: PBBFAC | Performed by: PEDIATRICS

## 2020-03-30 PROCEDURE — 93294 REM INTERROG EVL PM/LDLS PM: CPT | Mod: ,,, | Performed by: PEDIATRICS

## 2020-03-31 ENCOUNTER — TELEPHONE (OUTPATIENT)
Dept: PEDIATRIC CARDIOLOGY | Facility: CLINIC | Age: 35
End: 2020-03-31

## 2020-03-31 NOTE — TELEPHONE ENCOUNTER
Left message for  Kan Matamoros regarding device followup. [facility] will [facility] doing a clinic at Dr Westbrook's office. We would love to take care of you and service your device, but we will need to set you up an appointment in [facility] clinic with Dr Mcleod to continue REMOTE monitoring. Please let us know if you would like to schedule an appointment or speak with Dr Westbrook to see who they would like you follow up with. Please give us a call back to let us know what you choose to do. We will dis-enroll you from our  REMOTE device service if you choose to follow up elsewhere.

## 2020-04-01 ENCOUNTER — TELEPHONE (OUTPATIENT)
Dept: PEDIATRIC CARDIOLOGY | Facility: CLINIC | Age: 35
End: 2020-04-01

## 2020-04-01 NOTE — TELEPHONE ENCOUNTER
Spoke to patients mom, seng Kan was scheduled on Monday to do a remote transmission on device. Mom will send transmission and stated she was be followed up by Dr. Westbrook.

## 2020-04-02 LAB
AV DELAY - LONGEST: 200 MSEC
BATTERY VOLTAGE (V): 3.01 V
ERI (V): 2.83 V
IMPEDANCE RA LEAD (NATIVE): 665 OHMS
IMPEDANCE RA LEAD: 494 OHMS
OHS CV DC PP MS1: 0.4 MS
OHS CV DC PP MS2: 0.4 MS
OHS CV DC PP V1: NORMAL V
OHS CV DC PP V2: NORMAL V
P/R-WAVE RA LEAD (NATIVE): 16.5 MV
P/R-WAVE RA LEAD: 2.6 MV
PV DELAY - LONGEST: 180 MSEC
THRESHOLD MS RA LEAD (NATIVE): 0.4 MS
THRESHOLD MS RA LEAD: 0.4 MS
THRESHOLD V RA LEAD (NATIVE): 0.5 V
THRESHOLD V RA LEAD: 0.62 V

## 2020-07-06 ENCOUNTER — TELEPHONE (OUTPATIENT)
Dept: ENDOCRINOLOGY | Facility: CLINIC | Age: 35
End: 2020-07-06

## 2020-07-06 DIAGNOSIS — E03.8 SUBCLINICAL HYPOTHYROIDISM: ICD-10-CM

## 2020-07-06 RX ORDER — LEVOTHYROXINE SODIUM 50 UG/1
TABLET ORAL
Qty: 90 TABLET | Refills: 0 | OUTPATIENT
Start: 2020-07-06

## 2020-07-06 NOTE — TELEPHONE ENCOUNTER
Spoke with mother inquiring about daughter's medication, states she will talk to Dr Badillo when she sees him on the 8th July.

## 2020-07-06 NOTE — TELEPHONE ENCOUNTER
Called patient to inform her of visit scheduled, could not schedule virtual. [facility] Letter sent as a reminder of visit scheduled.

## 2020-08-19 ENCOUNTER — OFFICE VISIT (OUTPATIENT)
Dept: ENDOCRINOLOGY | Facility: CLINIC | Age: 35
End: 2020-08-19
Payer: MEDICARE

## 2020-08-19 VITALS
HEIGHT: 59 IN | BODY MASS INDEX: 22.43 KG/M2 | SYSTOLIC BLOOD PRESSURE: 92 MMHG | RESPIRATION RATE: 18 BRPM | HEART RATE: 60 BPM | TEMPERATURE: 98 F | WEIGHT: 111.25 LBS | DIASTOLIC BLOOD PRESSURE: 62 MMHG

## 2020-08-19 DIAGNOSIS — Z00.00 ROUTINE HEALTH MAINTENANCE: ICD-10-CM

## 2020-08-19 DIAGNOSIS — R79.9 ABNORMAL FINDING OF BLOOD CHEMISTRY, UNSPECIFIED: ICD-10-CM

## 2020-08-19 DIAGNOSIS — E55.9 VITAMIN D DEFICIENCY: ICD-10-CM

## 2020-08-19 DIAGNOSIS — R63.5 WEIGHT GAIN: ICD-10-CM

## 2020-08-19 DIAGNOSIS — Q90.9 DOWN SYNDROME: ICD-10-CM

## 2020-08-19 DIAGNOSIS — E66.3 OVERWEIGHT: ICD-10-CM

## 2020-08-19 DIAGNOSIS — E03.8 SUBCLINICAL HYPOTHYROIDISM: Primary | ICD-10-CM

## 2020-08-19 DIAGNOSIS — Q24.9 CONGENITAL HEART DISEASE IN ADULT: ICD-10-CM

## 2020-08-19 PROCEDURE — 99214 PR OFFICE/OUTPT VISIT, EST, LEVL IV, 30-39 MIN: ICD-10-PCS | Mod: S$PBB,,, | Performed by: INTERNAL MEDICINE

## 2020-08-19 PROCEDURE — 99999 PR PBB SHADOW E&M-EST. PATIENT-LVL III: ICD-10-PCS | Mod: PBBFAC,,, | Performed by: INTERNAL MEDICINE

## 2020-08-19 PROCEDURE — 99999 PR PBB SHADOW E&M-EST. PATIENT-LVL III: CPT | Mod: PBBFAC,,, | Performed by: INTERNAL MEDICINE

## 2020-08-19 PROCEDURE — 99213 OFFICE O/P EST LOW 20 MIN: CPT | Mod: PBBFAC | Performed by: INTERNAL MEDICINE

## 2020-08-19 PROCEDURE — 99214 OFFICE O/P EST MOD 30 MIN: CPT | Mod: S$PBB,,, | Performed by: INTERNAL MEDICINE

## 2020-08-19 RX ORDER — LEVOTHYROXINE SODIUM 50 UG/1
50 TABLET ORAL
Qty: 90 TABLET | Refills: 4 | Status: SHIPPED | OUTPATIENT
Start: 2020-08-19 | End: 2021-06-18

## 2020-08-19 NOTE — ASSESSMENT & PLAN NOTE
Endocrinopathies associated with Down syndrome include hypothyroidism and diabetes (positively correlated with type 1 diabetes). Screening A1c was normal in 2018. Serum glucose normal on labs in 2019.

## 2020-08-19 NOTE — PROGRESS NOTES
Subjective:      Chief Complaint: Follow-up for hypothyroidism    HPI: Kan Matamoros is a 35 y.o. female who is here for follow-up evaluation for hypothyroidism.    Last seen by me 2/7/2019    Kan has Down syndrome and has a history of large VSD and a PDA with heart failure, which was repaired when she was 1 years old. She has a pacemaker and is on midodrine for orthostatic hypotension. She was referred last year from Dr. Westbrook due to abnormal TFTs. She was having episodes of fatigue, weakness, lightheadedness and near syncope, for which she underwent tilt-table testing. We started her on Synthroid 50 mcg daily in 3/2018.    Still having the dizzy spells, but she has learned how to compensate by taking things slow. She has not had any episodes of passing out or falls related to this.    She has gained ~10 lbs since COVID. Her mom attributes this to being less active - no longer at [employer] until Phase 3 kicks in. She also has been snacking on raisins during the day and ice cream at night. She plans to regulate her intake and try to substitute more healthy snacks.    Current medications:  Brand name Synthroid 50 mcg once daily    Taking vitamin D once daily - has not had a level checked.     Ref. Range 2/12/2018 12:40 4/23/2018 07:40 6/19/2018 06:58 8/3/2018 07:01 12/20/2018 07:03   TSH 0.400 - 4.000 uIU/mL 7.364 (H) 6.059 (H) 3.325 2.989 3.084   Free T4 0.71 - 1.51 ng/dL 0.72 0.85  1.00      Her mom is my patient as well for Hashimoto's hypothyroidism. She also takes brand name Synthroid.    Reviewed past medical, family, social history and updated as appropriate.    Review of Systems   Constitutional: Positive for activity change (Fatigued when walking long distances.) and fatigue. Negative for unexpected weight change.   Eyes: Negative for visual disturbance.   Respiratory: Negative for shortness of breath.    Cardiovascular: Negative for chest pain.   Gastrointestinal: Negative for abdominal pain.    Genitourinary: Negative for urgency.   Musculoskeletal: Negative for arthralgias.   Skin: Negative for wound.   Neurological: Negative for headaches.   Hematological: Does not bruise/bleed easily.   Psychiatric/Behavioral: Negative for sleep disturbance.     Objective:     Vitals:    08/19/20 0808   BP: 92/62   Pulse: 60   Resp: 18   Temp: 97.7 °F (36.5 °C)       Physical Exam  Vitals signs and nursing note reviewed.   Constitutional:       General: She is not in acute distress.     Appearance: She is well-developed.      Comments: Short stature with classic Down syndrome features   HENT:      Head: Normocephalic and atraumatic.   Eyes:      General:         Right eye: No discharge.         Left eye: No discharge.      Conjunctiva/sclera: Conjunctivae normal.   Neck:      Thyroid: No thyromegaly.      Trachea: No tracheal deviation.   Cardiovascular:      Rate and Rhythm: Normal rate.   Pulmonary:      Effort: Pulmonary effort is normal. No respiratory distress.   Musculoskeletal:      Comments: No digital clubbing or extremity cyanosis   Skin:     General: Skin is warm and dry.      Comments: Mottled skin   Neurological:      Mental Status: She is alert and oriented to person, place, and time. Mental status is at baseline.      Coordination: Coordination normal.   Psychiatric:         Mood and Affect: Mood normal.         Behavior: Behavior normal.         Wt Readings from Last 10 Encounters:   08/19/20 0808 50.4 kg (111 lb 3.6 oz)   02/03/20 1159 45.7 kg (100 lb 12 oz)   06/26/19 0745 44.5 kg (98 lb)   06/13/19 0913 45.8 kg (100 lb 13.8 oz)   06/13/19 0926 45.8 kg (100 lb 13.8 oz)   02/07/19 1423 47.9 kg (105 lb 9.6 oz)   01/17/19 1407 48.1 kg (106 lb 2.4 oz)   08/07/18 1413 48.8 kg (107 lb 7.6 oz)   07/10/18 1425 48.9 kg (107 lb 11.1 oz)   03/06/18 0753 50.1 kg (110 lb 7.2 oz)     Lab Results   Component Value Date     06/26/2019    K 4.2 06/26/2019     06/26/2019    CO2 29 06/26/2019    GLU 95  06/26/2019    BUN 15 06/26/2019    CREATININE 0.9 06/26/2019    CALCIUM 9.5 06/26/2019    PROT 7.4 02/12/2018    ALBUMIN 4.0 02/12/2018    BILITOT 0.9 02/12/2018    ALKPHOS 62 02/12/2018    AST 30 02/12/2018    ALT 22 02/12/2018    ANIONGAP 8 06/26/2019    ESTGFRAFRICA >60.0 06/26/2019    EGFRNONAA >60.0 06/26/2019    TSH 3.084 12/20/2018        Assessment/Plan:     Subclinical hypothyroidism  Last TSH is WNL but was in 2018; due for update labs. Clinically euthyroid.    Continue Synthroid 50 mcg daily. She is taking it appropriately first thing in the AM, waiting 30 minutes prior to eating/drinking.    Down syndrome  Endocrinopathies associated with Down syndrome include hypothyroidism and diabetes (positively correlated with type 1 diabetes). Screening A1c was normal in 2018. Serum glucose normal on labs in 2019.    Congenital heart disease in adult  Avoid exogenous thyrotoxicosis.  Following regularly with Dr. Westbrook.     Vitamin D deficiency  Check vitamin D level.     There are no clear guidelines for osteoporosis screening in Down syndrome. Studies suggest low bone mass is common, but that bone turnover is also typically low calling into question the effectiveness of anti-resorptive treatments. Focus should be made on optimizing vitamin D and nutritional status and fall prevention.    Weight gain  Discussed dietary modification. Her weight is still in a good range currently, but we should take measures to avoid further weight gain.    Screen for diabetes with A1c.    RTC 12 months

## 2020-08-19 NOTE — ASSESSMENT & PLAN NOTE
Check vitamin D level.     There are no clear guidelines for osteoporosis screening in Down syndrome. Studies suggest low bone mass is common, but that bone turnover is also typically low calling into question the effectiveness of anti-resorptive treatments. Focus should be made on optimizing vitamin D and nutritional status and fall prevention.

## 2020-08-19 NOTE — ASSESSMENT & PLAN NOTE
Discussed dietary modification. Her weight is still in a good range currently, but we should take measures to avoid further weight gain.    Screen for diabetes with A1c.

## 2020-08-19 NOTE — ASSESSMENT & PLAN NOTE
Last TSH is WNL but was in 2018; due for update labs. Clinically euthyroid.    Continue Synthroid 50 mcg daily. She is taking it appropriately first thing in the AM, waiting 30 minutes prior to eating/drinking.

## 2020-09-24 ENCOUNTER — TELEPHONE (OUTPATIENT)
Dept: ENDOCRINOLOGY | Facility: CLINIC | Age: 35
End: 2020-09-24

## 2020-09-24 LAB
25(OH)D3 SERPL-MCNC: 39 NG/ML (ref 30–100)
HBA1C MFR BLD: 4.7 % OF TOTAL HGB
TSH SERPL-ACNC: 4.71 MIU/L

## 2020-09-24 NOTE — TELEPHONE ENCOUNTER
Please call Kan's mom and let her know that her thyroid test was right above the normal range at 4.7 (4.5 is normal), but not high enough that she needs a dose adjustment. She can keep taking the same dose of Synthroid for now and we will recheck next year. Let me know if she has questions.    Thanks!

## 2020-09-29 ENCOUNTER — TELEPHONE (OUTPATIENT)
Dept: ENDOCRINOLOGY | Facility: CLINIC | Age: 35
End: 2020-09-29

## 2020-09-29 NOTE — TELEPHONE ENCOUNTER
----- Message from Sid Scruggs MA sent at 9/25/2020  8:51 AM CDT -----  Regarding: FW: need  advice about food  Contact: pt' s mother  Mother wants to discuss something to help with her diet. Thank you    Toyini  ----- Message -----  From: Queenie Heck  Sent: 9/25/2020   8:37 AM CDT  To: [facility] Staff  Subject: need  advice about food                          Pt would like to be called back regarding labs     little higher   wants to discuss  something to help with her diet     What was her reading last time    before virus and being kept at home    Pt's  Nohelia Matamoros   can be reached at  [phone]

## 2020-10-01 ENCOUNTER — OFFICE VISIT (OUTPATIENT)
Dept: CARDIOLOGY | Facility: CLINIC | Age: 35
End: 2020-10-01
Attending: INTERNAL MEDICINE
Payer: MEDICARE

## 2020-10-01 ENCOUNTER — TELEPHONE (OUTPATIENT)
Dept: ENDOCRINOLOGY | Facility: CLINIC | Age: 35
End: 2020-10-01

## 2020-10-01 VITALS
SYSTOLIC BLOOD PRESSURE: 104 MMHG | WEIGHT: 117.94 LBS | BODY MASS INDEX: 23.78 KG/M2 | HEART RATE: 62 BPM | HEIGHT: 59 IN | DIASTOLIC BLOOD PRESSURE: 51 MMHG

## 2020-10-01 DIAGNOSIS — N18.1 CHRONIC KIDNEY DISEASE, STAGE 1: ICD-10-CM

## 2020-10-01 DIAGNOSIS — E03.8 SUBCLINICAL HYPOTHYROIDISM: ICD-10-CM

## 2020-10-01 DIAGNOSIS — I44.2 ATRIOVENTRICULAR BLOCK, COMPLETE: ICD-10-CM

## 2020-10-01 DIAGNOSIS — Q90.9 DOWN SYNDROME: ICD-10-CM

## 2020-10-01 DIAGNOSIS — E66.3 OVERWEIGHT: Primary | ICD-10-CM

## 2020-10-01 DIAGNOSIS — Q24.9 CONGENITAL HEART DISEASE IN ADULT: ICD-10-CM

## 2020-10-01 DIAGNOSIS — Z95.0 PACEMAKER: ICD-10-CM

## 2020-10-01 DIAGNOSIS — Q21.0 VSD (VENTRICULAR SEPTAL DEFECT): ICD-10-CM

## 2020-10-01 PROCEDURE — 99999 PR PBB SHADOW E&M-EST. PATIENT-LVL III: CPT | Mod: PBBFAC,,, | Performed by: INTERNAL MEDICINE

## 2020-10-01 PROCEDURE — 99213 OFFICE O/P EST LOW 20 MIN: CPT | Mod: PBBFAC | Performed by: INTERNAL MEDICINE

## 2020-10-01 PROCEDURE — 99999 PR PBB SHADOW E&M-EST. PATIENT-LVL III: ICD-10-PCS | Mod: PBBFAC,,, | Performed by: INTERNAL MEDICINE

## 2020-10-01 PROCEDURE — 99214 PR OFFICE/OUTPT VISIT, EST, LEVL IV, 30-39 MIN: ICD-10-PCS | Mod: S$PBB,,, | Performed by: INTERNAL MEDICINE

## 2020-10-01 PROCEDURE — 99214 OFFICE O/P EST MOD 30 MIN: CPT | Mod: S$PBB,,, | Performed by: INTERNAL MEDICINE

## 2020-10-01 RX ORDER — CLINDAMYCIN HYDROCHLORIDE 300 MG/1
CAPSULE ORAL
COMMUNITY
Start: 2020-09-02 | End: 2022-03-10 | Stop reason: SDUPTHER

## 2020-10-01 NOTE — PROGRESS NOTES
Subjective:     Kan Matamoros is a 35 y.o. female with Down's syndrome. In 1986 she had a VSD repaired with a Dacron patch and a patent ductus ligated. She developed complete heart block and she received a [employer]tronic pacemaker on 11/17/2016. She had issues with orthstatic hypotension and used to be on midodrine. VT appears to have been seen on one interrogation. No exertional chest pain or exertional shortness of breath. No palpitations or weak spells. No complaints. Feeling well overall.        Heart Problem  This is a chronic problem. The current episode started more than 1 year ago. The problem occurs constantly. The problem has been unchanged. Pertinent negatives include no abdominal pain, anorexia, arthralgias, change in bowel habit, chest pain, chills, congestion, coughing, diaphoresis, fatigue, fever, headaches, joint swelling, myalgias, nausea, neck pain, numbness, rash, sore throat, swollen glands, urinary symptoms, vertigo, visual change, vomiting or weakness.       Review of Systems   Constitution: Negative for chills, diaphoresis, fatigue, fever and malaise/fatigue.   HENT: Negative for congestion, nosebleeds and sore throat.    Eyes: Negative for double vision, vision loss in left eye and vision loss in right eye.   Cardiovascular: Negative for chest pain, claudication, dyspnea on exertion, irregular heartbeat, leg swelling, near-syncope, orthopnea, palpitations, paroxysmal nocturnal dyspnea and syncope.   Respiratory: Negative for cough, hemoptysis, shortness of breath and wheezing.    Endocrine: Negative for cold intolerance and heat intolerance.   Hematologic/Lymphatic: Negative for bleeding problem. Does not bruise/bleed easily.   Skin: Negative for color change and rash.   Musculoskeletal: Negative for arthralgias, back pain, falls, joint swelling, muscle weakness, myalgias and neck pain.   Gastrointestinal: Negative for abdominal pain, anorexia, change in bowel habit, heartburn, hematemesis,  "hematochezia, hemorrhoids, jaundice, melena, nausea and vomiting.   Genitourinary: Negative for dysuria and hematuria.   Neurological: Negative for dizziness, focal weakness, headaches, light-headedness, loss of balance, numbness, vertigo and weakness.   Psychiatric/Behavioral: Negative for altered mental status, depression and memory loss. The patient is not nervous/anxious.    Allergic/Immunologic: Negative for hives and persistent infections.       Current Outpatient Medications on File Prior to Visit   Medication Sig Dispense Refill    ascorbic acid, vitamin C, (VITAMIN C) 500 MG tablet Take 500 mg by mouth once daily.      aspirin (ECOTRIN) 81 MG EC tablet Take 81 mg by mouth once daily.      b complex vitamins capsule Take 1 capsule by mouth once daily.      cholecalciferol, vitamin D3, (VITAMIN D3) 1,000 unit capsule Take 1,000 Units by mouth once daily.      clindamycin (CLEOCIN) 300 MG capsule       metoprolol tartrate (LOPRESSOR) 25 MG tablet Take 25 mg by mouth 2 (two) times daily. 1/2 tablet once daily      MULTIVIT,CALC,MINS/IRON/FOLIC (ONE-A-DAY WOMENS FORMULA ORAL) Take by mouth.      SYNTHROID 50 mcg tablet Take 1 tablet (50 mcg total) by mouth before breakfast. 90 tablet 4    midodrine (PROAMATINE) 5 MG Tab Take 5 mg by mouth 2 (two) times daily with meals.       No current facility-administered medications on file prior to visit.        BP (!) 104/51 (BP Location: Right arm, Patient Position: Sitting, BP Method: Large (Automatic))   Pulse 62   Ht 4' 11" (1.499 m)   Wt 53.5 kg (117 lb 15.1 oz)   BMI 23.82 kg/m²       Objective:     Physical Exam   Constitutional: She is oriented to person, place, and time. She appears well-developed and well-nourished.  Non-toxic appearance. No distress.   HENT:   Head: Normocephalic and atraumatic.   Nose: Nose normal.   Eyes: Right eye exhibits no discharge. Left eye exhibits no discharge. Right conjunctiva is not injected. Left conjunctiva is not " injected. Right pupil is round. Left pupil is round. Pupils are equal.   Neck: Neck supple. No JVD present. Carotid bruit is not present. No thyromegaly present.   Cardiovascular: Normal rate, regular rhythm, S1 normal and S2 normal.  No extrasystoles are present. PMI is not displaced. Exam reveals no gallop.   Murmur heard.   Midsystolic murmur is present with a grade of 2/6 at the upper right sternal border.  Pulses:       Radial pulses are 2+ on the right side and 2+ on the left side.        Femoral pulses are 2+ on the right side and 2+ on the left side.       Dorsalis pedis pulses are 2+ on the right side and 2+ on the left side.        Posterior tibial pulses are 2+ on the right side and 2+ on the left side.   Pulmonary/Chest: Effort normal and breath sounds normal.   Pacemaker right upper chest wall.   Abdominal: Soft. Normal appearance. There is no hepatosplenomegaly. There is no abdominal tenderness.   Musculoskeletal:      Right ankle: She exhibits no swelling, no ecchymosis and no deformity.      Left ankle: She exhibits no swelling, no ecchymosis and no deformity.   Lymphadenopathy:        Head (right side): No submandibular adenopathy present.        Head (left side): No submandibular adenopathy present.     She has no cervical adenopathy.   Neurological: She is alert and oriented to person, place, and time. She is not disoriented. No cranial nerve deficit.   Skin: Skin is warm, dry and intact. No rash noted. She is not diaphoretic.   Psychiatric: She has a normal mood and affect. Her speech is normal and behavior is normal. Judgment and thought content normal. Cognition and memory are normal.       Assessment:     1. Congenital heart disease in adult    2. VSD (ventricular septal defect)    3. Pacemaker    4. Atrioventricular block, complete    5. Down syndrome    6. Subclinical hypothyroidism        Plan:     1. Congenital Heart Disease   1986: VSD Repair with patch and Ligation of Patent  Truncus.   Appears to be doing well.   Advised to stop metoprolol.   Plan Echo in future.    2. Pacemaker   11/17/2016: Medtronic DDDR pacemaker.   Plan interrogation. Set up [facility].    3. Atrioventricular Block, Complete   11/17/2016: Received pacemaker.    4. Down's Syndrome    5. Hypothyroidism   2018: Diagnosed.   On levothyroxine 50 mcg Q24.    F/u 1 months.    Madison Luciano M.D.

## 2020-10-01 NOTE — TELEPHONE ENCOUNTER
I spoke with Ms. Bonilla regarding her concern about Kan's weight.  She has been at home most days due to COVID, so she has been snacking a lot.  She is inquiring about healthy snack choices.  I recommended consultation with nutrition to come up with a good meal planned and also to discuss some healthy snack choices that Kan may like.    Her TSH is also a little bit elevated.  I recommended increasing the Synthroid to eight tablets per week (extra tablet on Mondays).  We will repeat the TSH in two months to reassess.      Please schedule patient with Nutrition.  Please schedule labs in two months.

## 2020-11-23 PROBLEM — Z00.00 ROUTINE HEALTH MAINTENANCE: Status: RESOLVED | Noted: 2020-08-19 | Resolved: 2020-11-23

## 2020-12-12 ENCOUNTER — LAB VISIT (OUTPATIENT)
Dept: LAB | Facility: HOSPITAL | Age: 35
End: 2020-12-12
Attending: INTERNAL MEDICINE
Payer: MEDICARE

## 2020-12-12 DIAGNOSIS — E03.8 SUBCLINICAL HYPOTHYROIDISM: ICD-10-CM

## 2020-12-12 LAB — TSH SERPL DL<=0.005 MIU/L-ACNC: 2.17 UIU/ML (ref 0.4–4)

## 2020-12-12 PROCEDURE — 36415 COLL VENOUS BLD VENIPUNCTURE: CPT | Mod: PO

## 2020-12-12 PROCEDURE — 84443 ASSAY THYROID STIM HORMONE: CPT

## 2020-12-14 ENCOUNTER — TELEPHONE (OUTPATIENT)
Dept: ENDOCRINOLOGY | Facility: CLINIC | Age: 35
End: 2020-12-14

## 2020-12-14 NOTE — TELEPHONE ENCOUNTER
Please call Kan's mom to let her know the thyroid level came back in the normal range.  She should continue the current dose of thyroid hormone, and we will plan to recheck when she comes back to clinic in August.

## 2020-12-14 NOTE — TELEPHONE ENCOUNTER
Spoke with patient's mother, gave results. Mother understands and c/o patient's  weight gain. Wanted you to know.

## 2020-12-28 ENCOUNTER — OFFICE VISIT (OUTPATIENT)
Dept: CARDIOLOGY | Facility: CLINIC | Age: 35
End: 2020-12-28
Attending: INTERNAL MEDICINE
Payer: MEDICARE

## 2020-12-28 VITALS
WEIGHT: 115.5 LBS | HEIGHT: 59 IN | SYSTOLIC BLOOD PRESSURE: 91 MMHG | DIASTOLIC BLOOD PRESSURE: 51 MMHG | BODY MASS INDEX: 23.28 KG/M2 | HEART RATE: 63 BPM

## 2020-12-28 DIAGNOSIS — Q24.9 CONGENITAL HEART DISEASE IN ADULT: ICD-10-CM

## 2020-12-28 DIAGNOSIS — I44.2 ATRIOVENTRICULAR BLOCK, COMPLETE: ICD-10-CM

## 2020-12-28 DIAGNOSIS — Q21.0 VSD (VENTRICULAR SEPTAL DEFECT): ICD-10-CM

## 2020-12-28 DIAGNOSIS — Q90.9 DOWN SYNDROME: ICD-10-CM

## 2020-12-28 DIAGNOSIS — E03.8 SUBCLINICAL HYPOTHYROIDISM: ICD-10-CM

## 2020-12-28 DIAGNOSIS — Z95.0 PACEMAKER: ICD-10-CM

## 2020-12-28 DIAGNOSIS — I47.20 VT (VENTRICULAR TACHYCARDIA): ICD-10-CM

## 2020-12-28 PROCEDURE — 99214 OFFICE O/P EST MOD 30 MIN: CPT | Mod: PBBFAC,25 | Performed by: INTERNAL MEDICINE

## 2020-12-28 PROCEDURE — 99999 PR PBB SHADOW E&M-EST. PATIENT-LVL IV: CPT | Mod: PBBFAC,,, | Performed by: INTERNAL MEDICINE

## 2020-12-28 PROCEDURE — 99215 OFFICE O/P EST HI 40 MIN: CPT | Mod: S$PBB,,, | Performed by: INTERNAL MEDICINE

## 2020-12-28 PROCEDURE — 93280 PR PROGRAM EVAL (IN PERSON) IMPLANT DEVICE,PACEMAKER,2 LEAD: ICD-10-PCS | Mod: 26,S$PBB,, | Performed by: INTERNAL MEDICINE

## 2020-12-28 PROCEDURE — 99215 PR OFFICE/OUTPT VISIT, EST, LEVL V, 40-54 MIN: ICD-10-PCS | Mod: S$PBB,,, | Performed by: INTERNAL MEDICINE

## 2020-12-28 PROCEDURE — 93280 PM DEVICE PROGR EVAL DUAL: CPT | Mod: 26,S$PBB,, | Performed by: INTERNAL MEDICINE

## 2020-12-28 PROCEDURE — 93280 PM DEVICE PROGR EVAL DUAL: CPT | Mod: PBBFAC | Performed by: INTERNAL MEDICINE

## 2020-12-28 PROCEDURE — 99999 PR PBB SHADOW E&M-EST. PATIENT-LVL IV: ICD-10-PCS | Mod: PBBFAC,,, | Performed by: INTERNAL MEDICINE

## 2020-12-28 RX ORDER — METOPROLOL SUCCINATE 25 MG/1
25 TABLET, EXTENDED RELEASE ORAL DAILY
Qty: 30 TABLET | Refills: 11 | Status: SHIPPED | OUTPATIENT
Start: 2020-12-28 | End: 2022-01-03

## 2020-12-28 RX ORDER — TERBINAFINE HYDROCHLORIDE 250 MG/1
TABLET ORAL
COMMUNITY
Start: 2020-11-06

## 2020-12-28 NOTE — PROGRESS NOTES
Subjective:     Kan Matamoros is a 35 y.o. female with Down's syndrome. In 1986 she had a VSD repaired with a Dacron patch and a patent ductus ligated. She developed complete heart block and she received a Medtronic pacemaker on 11/17/2016. She had issues with orthstatic hypotension and used to be on midodrine. VT appears to have been seen on one interrogation. No exertional chest pain or exertional shortness of breath. No palpitations or weak spells. No complaints. Feeling well overall.        Heart Problem  This is a chronic problem. The current episode started more than 1 year ago. Pertinent negatives include no abdominal pain, anorexia, arthralgias, change in bowel habit, chest pain, chills, congestion, coughing, diaphoresis, fatigue, fever, headaches, joint swelling, myalgias, nausea, neck pain, numbness, rash, sore throat, swollen glands, urinary symptoms, vertigo, visual change, vomiting or weakness.       Review of Systems   Constitution: Negative for chills, diaphoresis, fatigue, fever and malaise/fatigue.   HENT: Negative for congestion, nosebleeds and sore throat.    Eyes: Negative for double vision, vision loss in left eye and vision loss in right eye.   Cardiovascular: Negative for chest pain, claudication, dyspnea on exertion, irregular heartbeat, leg swelling, near-syncope, orthopnea, palpitations, paroxysmal nocturnal dyspnea and syncope.   Respiratory: Negative for cough, hemoptysis, shortness of breath and wheezing.    Endocrine: Negative for cold intolerance and heat intolerance.   Hematologic/Lymphatic: Negative for bleeding problem. Does not bruise/bleed easily.   Skin: Negative for color change and rash.   Musculoskeletal: Negative for arthralgias, back pain, falls, joint swelling, muscle weakness, myalgias and neck pain.   Gastrointestinal: Negative for abdominal pain, anorexia, change in bowel habit, heartburn, hematemesis, hematochezia, hemorrhoids, jaundice, melena, nausea and vomiting.  "  Genitourinary: Negative for dysuria and hematuria.   Neurological: Negative for dizziness, focal weakness, headaches, light-headedness, loss of balance, numbness, vertigo and weakness.   Psychiatric/Behavioral: Negative for altered mental status, depression and memory loss. The patient is not nervous/anxious.    Allergic/Immunologic: Negative for hives and persistent infections.       Current Outpatient Medications on File Prior to Visit   Medication Sig Dispense Refill    ascorbic acid, vitamin C, (VITAMIN C) 500 MG tablet Take 500 mg by mouth once daily.      aspirin (ECOTRIN) 81 MG EC tablet Take 81 mg by mouth once daily.      b complex vitamins capsule Take 1 capsule by mouth once daily.      cholecalciferol, vitamin D3, (VITAMIN D3) 1,000 unit capsule Take 1,000 Units by mouth once daily.      clindamycin (CLEOCIN) 300 MG capsule       metoprolol tartrate (LOPRESSOR) 25 MG tablet Take 25 mg by mouth 2 (two) times daily. 1/2 tablet once daily      midodrine (PROAMATINE) 5 MG Tab Take 5 mg by mouth 2 (two) times daily with meals.      MULTIVIT,CALC,MINS/IRON/FOLIC (ONE-A-DAY WOMENS FORMULA ORAL) Take by mouth.      multivitamin capsule Take 1 capsule by mouth.      SYNTHROID 50 mcg tablet Take 1 tablet (50 mcg total) by mouth before breakfast. 90 tablet 4    terbinafine HCL (LAMISIL) 250 mg tablet        No current facility-administered medications on file prior to visit.        BP (!) 91/51 (BP Location: Left arm, Patient Position: Sitting, BP Method: Large (Automatic))   Pulse 63   Ht 4' 11" (1.499 m)   Wt 52.4 kg (115 lb 8.3 oz)   BMI 23.33 kg/m²       Objective:     Physical Exam   Constitutional: She is oriented to person, place, and time. She appears well-developed and well-nourished.  Non-toxic appearance. No distress.   HENT:   Head: Normocephalic and atraumatic.   Nose: Nose normal.   Eyes: Right eye exhibits no discharge. Left eye exhibits no discharge. Right conjunctiva is not " injected. Left conjunctiva is not injected. Right pupil is round. Left pupil is round. Pupils are equal.   Neck: Neck supple. No JVD present. Carotid bruit is not present. No thyromegaly present.   Cardiovascular: Normal rate, regular rhythm, S1 normal and S2 normal.  No extrasystoles are present. PMI is not displaced. Exam reveals no gallop.   Murmur heard.   Midsystolic murmur is present with a grade of 2/6 at the upper right sternal border.  Pulses:       Radial pulses are 2+ on the right side and 2+ on the left side.        Femoral pulses are 2+ on the right side and 2+ on the left side.       Dorsalis pedis pulses are 2+ on the right side and 2+ on the left side.        Posterior tibial pulses are 2+ on the right side and 2+ on the left side.   Pulmonary/Chest: Effort normal and breath sounds normal.   Pacemaker right upper chest wall.   Abdominal: Soft. Normal appearance. There is no hepatosplenomegaly. There is no abdominal tenderness.   Musculoskeletal:      Right ankle: She exhibits no swelling, no ecchymosis and no deformity.      Left ankle: She exhibits no swelling, no ecchymosis and no deformity.   Lymphadenopathy:        Head (right side): No submandibular adenopathy present.        Head (left side): No submandibular adenopathy present.     She has no cervical adenopathy.   Neurological: She is alert and oriented to person, place, and time. She is not disoriented. No cranial nerve deficit.   Skin: Skin is warm, dry and intact. No rash noted. She is not diaphoretic.   Psychiatric: She has a normal mood and affect. Her speech is normal and behavior is normal. Judgment and thought content normal. Cognition and memory are normal.       Assessment:     1. Congenital heart disease in adult    2. VSD (ventricular septal defect)    3. Pacemaker    4. Atrioventricular block, complete    5. VT (ventricular tachycardia)    6. Down syndrome    7. Subclinical hypothyroidism        Plan:     1. Congenital Heart  Disease   1986: VSD Repair with patch and Ligation of Patent Truncus.   Appears to be doing well.   Plan Echo.    2. Pacemaker   11/17/2016: Medtronic DDDR pacemaker.   12/28/2020: Programmed & Fine: 70% A paced. 100% V paced. 5 episodes of NSVT - longest 9 seconds at 170 bpm. Rate responsiveness was increased. Estimated JAYLIN 2025.   2/2021: Plan next interrogation. Carelink to be set up.    3. Atrioventricular Block, Complete   11/17/2016: Received pacemaker.    4. Ventricular Tachycardia   12/28/2020: Programmed & Fine: 5 episodes of NSVT - longest 9 seconds at 170 bpm.   The episode of VT occurred when she was on metoprolol.   12/28/2020: Metoprolol 25 mg Q24 to be resumed.    5. Orthostatic Hypotension   Mentioned in chart.   Used to be given midodrine.      6. Down's Syndrome    7. Hypothyroidism   2018: Diagnosed.   On levothyroxine 50 mcg Q24.    F/u 2 months.    Madison Luciano M.D.

## 2020-12-29 ENCOUNTER — TELEPHONE (OUTPATIENT)
Dept: CARDIOLOGY | Facility: CLINIC | Age: 35
End: 2020-12-29

## 2020-12-29 NOTE — TELEPHONE ENCOUNTER
During visit yesterday Metoprolol was prescribed patient's mother called to report in the past Metoprolol 12.5 mg  was discontinued due to low BP.  She did not  take Metoprolol today.      Please advise.

## 2020-12-30 ENCOUNTER — TELEPHONE (OUTPATIENT)
Dept: CARDIOLOGY | Facility: CLINIC | Age: 35
End: 2020-12-30

## 2020-12-30 NOTE — TELEPHONE ENCOUNTER
Discussed with mother.    ----- Message from Libby Mendez sent at 12/30/2020  9:22 AM CST -----  Who Called: MotherWhat is the reqeust in detail:Patient mother is  calling in reference to medication. She stated no one has returned her call, and would like to speak with someone before she gives meds to daughter. Please adviseCan the clinic reply by [facility]: [phone]

## 2021-02-01 ENCOUNTER — TELEPHONE (OUTPATIENT)
Dept: CARDIOLOGY | Facility: CLINIC | Age: 36
End: 2021-02-01

## 2021-02-08 ENCOUNTER — TELEPHONE (OUTPATIENT)
Dept: CARDIOLOGY | Facility: CLINIC | Age: 36
End: 2021-02-08

## 2021-02-22 ENCOUNTER — OFFICE VISIT (OUTPATIENT)
Dept: CARDIOLOGY | Facility: CLINIC | Age: 36
End: 2021-02-22
Attending: INTERNAL MEDICINE
Payer: MEDICARE

## 2021-02-22 VITALS
HEIGHT: 59 IN | BODY MASS INDEX: 23.95 KG/M2 | SYSTOLIC BLOOD PRESSURE: 88 MMHG | HEART RATE: 63 BPM | WEIGHT: 118.81 LBS | DIASTOLIC BLOOD PRESSURE: 64 MMHG

## 2021-02-22 DIAGNOSIS — Q24.9 CONGENITAL HEART DISEASE IN ADULT: ICD-10-CM

## 2021-02-22 DIAGNOSIS — Z95.0 PACEMAKER: ICD-10-CM

## 2021-02-22 DIAGNOSIS — I47.20 VENTRICULAR TACHYCARDIA: ICD-10-CM

## 2021-02-22 DIAGNOSIS — Q21.0 VENTRICULAR SEPTAL DEFECT: ICD-10-CM

## 2021-02-22 DIAGNOSIS — Q90.9 DOWN SYNDROME: ICD-10-CM

## 2021-02-22 DIAGNOSIS — E03.8 SUBCLINICAL HYPOTHYROIDISM: ICD-10-CM

## 2021-02-22 DIAGNOSIS — I44.2 ATRIOVENTRICULAR BLOCK, COMPLETE: ICD-10-CM

## 2021-02-22 PROCEDURE — 99999 PR PBB SHADOW E&M-EST. PATIENT-LVL II: ICD-10-PCS | Mod: PBBFAC,,, | Performed by: INTERNAL MEDICINE

## 2021-02-22 PROCEDURE — 99999 PR PBB SHADOW E&M-EST. PATIENT-LVL II: CPT | Mod: PBBFAC,,, | Performed by: INTERNAL MEDICINE

## 2021-02-22 PROCEDURE — 99212 OFFICE O/P EST SF 10 MIN: CPT | Mod: PBBFAC | Performed by: INTERNAL MEDICINE

## 2021-02-22 PROCEDURE — 99214 OFFICE O/P EST MOD 30 MIN: CPT | Mod: S$PBB,,, | Performed by: INTERNAL MEDICINE

## 2021-02-22 PROCEDURE — 99214 PR OFFICE/OUTPT VISIT, EST, LEVL IV, 30-39 MIN: ICD-10-PCS | Mod: S$PBB,,, | Performed by: INTERNAL MEDICINE

## 2021-03-02 ENCOUNTER — HOSPITAL ENCOUNTER (OUTPATIENT)
Dept: CARDIOLOGY | Facility: OTHER | Age: 36
Discharge: HOME | End: 2021-03-02
Attending: INTERNAL MEDICINE
Payer: MEDICARE

## 2021-03-02 VITALS
HEIGHT: 59 IN | WEIGHT: 118 LBS | DIASTOLIC BLOOD PRESSURE: 64 MMHG | BODY MASS INDEX: 23.79 KG/M2 | SYSTOLIC BLOOD PRESSURE: 88 MMHG | HEART RATE: 63 BPM

## 2021-03-02 DIAGNOSIS — Q21.0 VENTRICULAR SEPTAL DEFECT: ICD-10-CM

## 2021-03-02 DIAGNOSIS — Q24.9 CONGENITAL HEART DISEASE IN ADULT: ICD-10-CM

## 2021-03-02 LAB
AV INDEX (PROSTH): 0.75
AV MEAN GRADIENT: 2 MMHG
AV PEAK GRADIENT: 5 MMHG
AV VALVE AREA: 2.05 CM2
AV VELOCITY RATIO: 0.54
BSA FOR ECHO PROCEDURE: 1.49 M2
CV ECHO LV RWT: 0.4 CM
DOP CALC AO PEAK VEL: 1.12 M/S
DOP CALC AO VTI: 18.59 CM
DOP CALC LVOT AREA: 2.7 CM2
DOP CALC LVOT DIAMETER: 1.86 CM
DOP CALC LVOT PEAK VEL: 0.61 M/S
DOP CALC LVOT STROKE VOLUME: 38.05 CM3
DOP CALCLVOT PEAK VEL VTI: 14.01 CM
E WAVE DECELERATION TIME: 200.83 MSEC
E/A RATIO: 1.41
E/E' RATIO: 5.75 M/S
ECHO LV POSTERIOR WALL: 0.67 CM (ref 0.6–1.1)
FRACTIONAL SHORTENING: 32 % (ref 28–44)
INTERVENTRICULAR SEPTUM: 0.68 CM (ref 0.6–1.1)
IVRT: 83.04 MSEC
LA MAJOR: 4.1 CM
LA MINOR: 3.57 CM
LA WIDTH: 3.62 CM
LEFT ATRIUM SIZE: 2.5 CM
LEFT ATRIUM VOLUME INDEX MOD: 23.1 ML/M2
LEFT ATRIUM VOLUME INDEX: 20 ML/M2
LEFT ATRIUM VOLUME MOD: 34 CM3
LEFT ATRIUM VOLUME: 29.36 CM3
LEFT INTERNAL DIMENSION IN SYSTOLE: 2.32 CM (ref 2.1–4)
LEFT VENTRICLE DIASTOLIC VOLUME INDEX: 31.98 ML/M2
LEFT VENTRICLE DIASTOLIC VOLUME: 47.01 ML
LEFT VENTRICLE MASS INDEX: 39 G/M2
LEFT VENTRICLE SYSTOLIC VOLUME INDEX: 12.6 ML/M2
LEFT VENTRICLE SYSTOLIC VOLUME: 18.51 ML
LEFT VENTRICULAR INTERNAL DIMENSION IN DIASTOLE: 3.39 CM (ref 3.5–6)
LEFT VENTRICULAR MASS: 56.79 G
LV LATERAL E/E' RATIO: 6.27 M/S
LV SEPTAL E/E' RATIO: 5.31 M/S
MV PEAK A VEL: 0.49 M/S
MV PEAK E VEL: 0.69 M/S
MV STENOSIS PRESSURE HALF TIME: 58.24 MS
MV VALVE AREA P 1/2 METHOD: 3.78 CM2
PISA MRMAX VEL: 0.02 M/S
PISA TR MAX VEL: 2.41 M/S
PULM VEIN S/D RATIO: 1.3
PV PEAK D VEL: 0.46 M/S
PV PEAK S VEL: 0.6 M/S
PV PEAK VELOCITY: 1.19 CM/S
RA MAJOR: 4.25 CM
RA PRESSURE: 3 MMHG
SINUS: 2.29 CM
STJ: 1.94 CM
TDI LATERAL: 0.11 M/S
TDI SEPTAL: 0.13 M/S
TDI: 0.12 M/S
TR MAX PG: 23 MMHG
TRICUSPID ANNULAR PLANE SYSTOLIC EXCURSION: 1.59 CM
TV REST PULMONARY ARTERY PRESSURE: 26 MMHG

## 2021-03-02 PROCEDURE — 93306 ECHO (CUPID ONLY): ICD-10-PCS | Mod: 26,,, | Performed by: INTERNAL MEDICINE

## 2021-03-02 PROCEDURE — 93306 TTE W/DOPPLER COMPLETE: CPT | Mod: 26,,, | Performed by: INTERNAL MEDICINE

## 2021-03-02 PROCEDURE — 93306 TTE W/DOPPLER COMPLETE: CPT

## 2021-03-30 ENCOUNTER — CLINICAL SUPPORT (OUTPATIENT)
Dept: CARDIOLOGY | Facility: CLINIC | Age: 36
End: 2021-03-30
Payer: MEDICARE

## 2021-03-30 DIAGNOSIS — Z95.0 PACEMAKER: Primary | ICD-10-CM

## 2021-03-30 DIAGNOSIS — I44.2 ATRIOVENTRICULAR BLOCK, COMPLETE: ICD-10-CM

## 2021-03-30 PROCEDURE — 99499 UNLISTED E&M SERVICE: CPT | Mod: S$PBB,,, | Performed by: INTERNAL MEDICINE

## 2021-03-30 PROCEDURE — 93296 REM INTERROG EVL PM/IDS: CPT

## 2021-03-30 PROCEDURE — 93294 REM INTERROG EVL PM/LDLS PM: CPT | Mod: ,,, | Performed by: INTERNAL MEDICINE

## 2021-03-30 PROCEDURE — 99499 NO LOS: ICD-10-PCS | Mod: S$PBB,,, | Performed by: INTERNAL MEDICINE

## 2021-03-30 PROCEDURE — 93294 PR INTERROG EVAL, REMOTE, UP TO 90 DAYS,PACEMAKER: ICD-10-PCS | Mod: ,,, | Performed by: INTERNAL MEDICINE

## 2021-04-08 ENCOUNTER — OFFICE VISIT (OUTPATIENT)
Dept: CARDIOLOGY | Facility: CLINIC | Age: 36
End: 2021-04-08
Attending: INTERNAL MEDICINE
Payer: MEDICARE

## 2021-04-08 VITALS
DIASTOLIC BLOOD PRESSURE: 70 MMHG | SYSTOLIC BLOOD PRESSURE: 94 MMHG | HEIGHT: 59 IN | BODY MASS INDEX: 23.93 KG/M2 | WEIGHT: 118.69 LBS | HEART RATE: 66 BPM

## 2021-04-08 DIAGNOSIS — I44.2 ATRIOVENTRICULAR BLOCK, COMPLETE: ICD-10-CM

## 2021-04-08 DIAGNOSIS — Z95.0 PACEMAKER: ICD-10-CM

## 2021-04-08 DIAGNOSIS — Q90.9 DOWN SYNDROME: ICD-10-CM

## 2021-04-08 DIAGNOSIS — I47.20 VENTRICULAR TACHYCARDIA: ICD-10-CM

## 2021-04-08 DIAGNOSIS — Q21.0 VENTRICULAR SEPTAL DEFECT: ICD-10-CM

## 2021-04-08 DIAGNOSIS — E03.8 SUBCLINICAL HYPOTHYROIDISM: ICD-10-CM

## 2021-04-08 DIAGNOSIS — Q24.9 CONGENITAL HEART DISEASE IN ADULT: ICD-10-CM

## 2021-04-08 PROCEDURE — 99999 PR PBB SHADOW E&M-EST. PATIENT-LVL III: ICD-10-PCS | Mod: PBBFAC,,, | Performed by: INTERNAL MEDICINE

## 2021-04-08 PROCEDURE — 99214 OFFICE O/P EST MOD 30 MIN: CPT | Mod: S$PBB,,, | Performed by: INTERNAL MEDICINE

## 2021-04-08 PROCEDURE — 93280 PM DEVICE PROGR EVAL DUAL: CPT | Mod: PBBFAC | Performed by: INTERNAL MEDICINE

## 2021-04-08 PROCEDURE — 93280 PM DEVICE PROGR EVAL DUAL: CPT | Mod: 26,S$PBB,, | Performed by: INTERNAL MEDICINE

## 2021-04-08 PROCEDURE — 99214 PR OFFICE/OUTPT VISIT, EST, LEVL IV, 30-39 MIN: ICD-10-PCS | Mod: S$PBB,,, | Performed by: INTERNAL MEDICINE

## 2021-04-08 PROCEDURE — 99999 PR PBB SHADOW E&M-EST. PATIENT-LVL III: CPT | Mod: PBBFAC,,, | Performed by: INTERNAL MEDICINE

## 2021-04-08 PROCEDURE — 93280 PR PROGRAM EVAL (IN PERSON) IMPLANT DEVICE,PACEMAKER,2 LEAD: ICD-10-PCS | Mod: 26,S$PBB,, | Performed by: INTERNAL MEDICINE

## 2021-04-08 PROCEDURE — 99213 OFFICE O/P EST LOW 20 MIN: CPT | Mod: PBBFAC,25 | Performed by: INTERNAL MEDICINE

## 2021-04-13 ENCOUNTER — OFFICE VISIT (OUTPATIENT)
Dept: CARDIOLOGY | Facility: CLINIC | Age: 36
End: 2021-04-13
Attending: INTERNAL MEDICINE
Payer: MEDICARE

## 2021-04-13 VITALS
DIASTOLIC BLOOD PRESSURE: 45 MMHG | SYSTOLIC BLOOD PRESSURE: 91 MMHG | BODY MASS INDEX: 24 KG/M2 | HEIGHT: 59 IN | WEIGHT: 119.06 LBS | HEART RATE: 62 BPM

## 2021-04-13 DIAGNOSIS — I44.2 ATRIOVENTRICULAR BLOCK, COMPLETE: ICD-10-CM

## 2021-04-13 DIAGNOSIS — R07.2 PRECORDIAL PAIN: ICD-10-CM

## 2021-04-13 DIAGNOSIS — Q90.9 DOWN SYNDROME: ICD-10-CM

## 2021-04-13 DIAGNOSIS — Q24.9 CONGENITAL HEART DISEASE IN ADULT: ICD-10-CM

## 2021-04-13 DIAGNOSIS — E03.8 SUBCLINICAL HYPOTHYROIDISM: ICD-10-CM

## 2021-04-13 DIAGNOSIS — Q21.0 VENTRICULAR SEPTAL DEFECT: ICD-10-CM

## 2021-04-13 DIAGNOSIS — I47.20 VENTRICULAR TACHYCARDIA: ICD-10-CM

## 2021-04-13 DIAGNOSIS — Z95.0 PACEMAKER: ICD-10-CM

## 2021-04-13 PROBLEM — R07.9 CHEST PAIN: Status: ACTIVE | Noted: 2021-04-13

## 2021-04-13 PROCEDURE — 99214 PR OFFICE/OUTPT VISIT, EST, LEVL IV, 30-39 MIN: ICD-10-PCS | Mod: S$PBB,,, | Performed by: INTERNAL MEDICINE

## 2021-04-13 PROCEDURE — 99999 PR PBB SHADOW E&M-EST. PATIENT-LVL III: ICD-10-PCS | Mod: PBBFAC,,, | Performed by: INTERNAL MEDICINE

## 2021-04-13 PROCEDURE — 99999 PR PBB SHADOW E&M-EST. PATIENT-LVL III: CPT | Mod: PBBFAC,,, | Performed by: INTERNAL MEDICINE

## 2021-04-13 PROCEDURE — 99214 OFFICE O/P EST MOD 30 MIN: CPT | Mod: S$PBB,,, | Performed by: INTERNAL MEDICINE

## 2021-04-13 PROCEDURE — 99213 OFFICE O/P EST LOW 20 MIN: CPT | Mod: PBBFAC | Performed by: INTERNAL MEDICINE

## 2021-06-15 ENCOUNTER — TELEPHONE (OUTPATIENT)
Dept: ENDOCRINOLOGY | Facility: CLINIC | Age: 36
End: 2021-06-15

## 2021-06-15 DIAGNOSIS — E03.8 SUBCLINICAL HYPOTHYROIDISM: Primary | ICD-10-CM

## 2021-06-17 ENCOUNTER — LAB VISIT (OUTPATIENT)
Dept: LAB | Facility: HOSPITAL | Age: 36
End: 2021-06-17
Attending: INTERNAL MEDICINE
Payer: MEDICARE

## 2021-06-17 DIAGNOSIS — E03.8 SUBCLINICAL HYPOTHYROIDISM: ICD-10-CM

## 2021-06-17 LAB
T4 FREE SERPL-MCNC: 0.86 NG/DL (ref 0.71–1.51)
TSH SERPL DL<=0.005 MIU/L-ACNC: 5.93 UIU/ML (ref 0.4–4)

## 2021-06-17 PROCEDURE — 36415 COLL VENOUS BLD VENIPUNCTURE: CPT | Mod: PO | Performed by: INTERNAL MEDICINE

## 2021-06-17 PROCEDURE — 84439 ASSAY OF FREE THYROXINE: CPT | Performed by: INTERNAL MEDICINE

## 2021-06-17 PROCEDURE — 84443 ASSAY THYROID STIM HORMONE: CPT | Performed by: INTERNAL MEDICINE

## 2021-06-18 ENCOUNTER — OFFICE VISIT (OUTPATIENT)
Dept: ENDOCRINOLOGY | Facility: CLINIC | Age: 36
End: 2021-06-18
Payer: MEDICARE

## 2021-06-18 VITALS
DIASTOLIC BLOOD PRESSURE: 80 MMHG | WEIGHT: 120.38 LBS | SYSTOLIC BLOOD PRESSURE: 110 MMHG | HEIGHT: 59 IN | BODY MASS INDEX: 24.27 KG/M2

## 2021-06-18 DIAGNOSIS — E03.8 HYPOTHYROIDISM DUE TO HASHIMOTO'S THYROIDITIS: ICD-10-CM

## 2021-06-18 DIAGNOSIS — E03.9 ACQUIRED HYPOTHYROIDISM: ICD-10-CM

## 2021-06-18 DIAGNOSIS — E03.8 SUBCLINICAL HYPOTHYROIDISM: Primary | ICD-10-CM

## 2021-06-18 DIAGNOSIS — Q24.9 CONGENITAL HEART DISEASE IN ADULT: ICD-10-CM

## 2021-06-18 DIAGNOSIS — E55.9 VITAMIN D DEFICIENCY: ICD-10-CM

## 2021-06-18 DIAGNOSIS — R63.5 WEIGHT GAIN: ICD-10-CM

## 2021-06-18 DIAGNOSIS — E06.3 HYPOTHYROIDISM DUE TO HASHIMOTO'S THYROIDITIS: ICD-10-CM

## 2021-06-18 DIAGNOSIS — Q90.9 DOWN SYNDROME: ICD-10-CM

## 2021-06-18 PROCEDURE — 99999 PR PBB SHADOW E&M-EST. PATIENT-LVL III: CPT | Mod: PBBFAC,,, | Performed by: INTERNAL MEDICINE

## 2021-06-18 PROCEDURE — 99999 PR PBB SHADOW E&M-EST. PATIENT-LVL III: ICD-10-PCS | Mod: PBBFAC,,, | Performed by: INTERNAL MEDICINE

## 2021-06-18 PROCEDURE — 99214 PR OFFICE/OUTPT VISIT, EST, LEVL IV, 30-39 MIN: ICD-10-PCS | Mod: S$PBB,,, | Performed by: INTERNAL MEDICINE

## 2021-06-18 PROCEDURE — 99214 OFFICE O/P EST MOD 30 MIN: CPT | Mod: S$PBB,,, | Performed by: INTERNAL MEDICINE

## 2021-06-18 PROCEDURE — 99213 OFFICE O/P EST LOW 20 MIN: CPT | Mod: PBBFAC | Performed by: INTERNAL MEDICINE

## 2021-06-18 RX ORDER — LEVOTHYROXINE SODIUM 75 UG/1
75 TABLET ORAL
Qty: 90 TABLET | Refills: 3 | Status: SHIPPED | OUTPATIENT
Start: 2021-06-18 | End: 2021-06-18 | Stop reason: SDUPTHER

## 2021-06-18 RX ORDER — LEVOTHYROXINE SODIUM 75 UG/1
75 TABLET ORAL
Qty: 90 TABLET | Refills: 3 | Status: SHIPPED | OUTPATIENT
Start: 2021-06-18 | End: 2021-10-08

## 2021-07-08 ENCOUNTER — OFFICE VISIT (OUTPATIENT)
Dept: CARDIOLOGY | Facility: CLINIC | Age: 36
End: 2021-07-08
Attending: INTERNAL MEDICINE
Payer: MEDICARE

## 2021-07-08 VITALS
WEIGHT: 119.69 LBS | OXYGEN SATURATION: 97 % | HEART RATE: 66 BPM | BODY MASS INDEX: 24.13 KG/M2 | HEIGHT: 59 IN | SYSTOLIC BLOOD PRESSURE: 96 MMHG | DIASTOLIC BLOOD PRESSURE: 64 MMHG

## 2021-07-08 DIAGNOSIS — Q90.9 DOWN SYNDROME: ICD-10-CM

## 2021-07-08 DIAGNOSIS — I47.20 VENTRICULAR TACHYCARDIA: ICD-10-CM

## 2021-07-08 DIAGNOSIS — I44.2 ATRIOVENTRICULAR BLOCK, COMPLETE: ICD-10-CM

## 2021-07-08 DIAGNOSIS — E03.9 ACQUIRED HYPOTHYROIDISM: ICD-10-CM

## 2021-07-08 DIAGNOSIS — Z95.0 PACEMAKER: ICD-10-CM

## 2021-07-08 DIAGNOSIS — Q21.0 VENTRICULAR SEPTAL DEFECT: ICD-10-CM

## 2021-07-08 DIAGNOSIS — R07.2 PRECORDIAL PAIN: ICD-10-CM

## 2021-07-08 DIAGNOSIS — G47.33 OBSTRUCTIVE SLEEP APNEA: ICD-10-CM

## 2021-07-08 DIAGNOSIS — Q24.9 CONGENITAL HEART DISEASE IN ADULT: ICD-10-CM

## 2021-07-08 PROCEDURE — 99213 OFFICE O/P EST LOW 20 MIN: CPT | Mod: PBBFAC | Performed by: INTERNAL MEDICINE

## 2021-07-08 PROCEDURE — 99214 OFFICE O/P EST MOD 30 MIN: CPT | Mod: S$PBB,,, | Performed by: INTERNAL MEDICINE

## 2021-07-08 PROCEDURE — 99999 PR PBB SHADOW E&M-EST. PATIENT-LVL III: CPT | Mod: PBBFAC,,, | Performed by: INTERNAL MEDICINE

## 2021-07-08 PROCEDURE — 93280 PR PROGRAM EVAL (IN PERSON) IMPLANT DEVICE,PACEMAKER,2 LEAD: ICD-10-PCS | Mod: 26,S$PBB,, | Performed by: INTERNAL MEDICINE

## 2021-07-08 PROCEDURE — 99999 PR PBB SHADOW E&M-EST. PATIENT-LVL III: ICD-10-PCS | Mod: PBBFAC,,, | Performed by: INTERNAL MEDICINE

## 2021-07-08 PROCEDURE — 93280 PM DEVICE PROGR EVAL DUAL: CPT | Mod: 26,S$PBB,, | Performed by: INTERNAL MEDICINE

## 2021-07-08 PROCEDURE — 99214 PR OFFICE/OUTPT VISIT, EST, LEVL IV, 30-39 MIN: ICD-10-PCS | Mod: S$PBB,,, | Performed by: INTERNAL MEDICINE

## 2021-07-08 PROCEDURE — 93280 PM DEVICE PROGR EVAL DUAL: CPT | Mod: PBBFAC | Performed by: INTERNAL MEDICINE

## 2021-08-23 ENCOUNTER — TELEPHONE (OUTPATIENT)
Dept: CARDIOLOGY | Facility: CLINIC | Age: 36
End: 2021-08-23

## 2021-09-30 ENCOUNTER — TELEPHONE (OUTPATIENT)
Dept: ENDOCRINOLOGY | Facility: CLINIC | Age: 36
End: 2021-09-30

## 2021-09-30 DIAGNOSIS — E03.8 SUBCLINICAL HYPOTHYROIDISM: Primary | ICD-10-CM

## 2021-09-30 DIAGNOSIS — E55.9 VITAMIN D DEFICIENCY: ICD-10-CM

## 2021-10-07 ENCOUNTER — OFFICE VISIT (OUTPATIENT)
Dept: CARDIOLOGY | Facility: CLINIC | Age: 36
End: 2021-10-07
Attending: INTERNAL MEDICINE
Payer: MEDICARE

## 2021-10-07 VITALS
DIASTOLIC BLOOD PRESSURE: 61 MMHG | HEART RATE: 63 BPM | HEIGHT: 59 IN | WEIGHT: 119.38 LBS | SYSTOLIC BLOOD PRESSURE: 97 MMHG | BODY MASS INDEX: 24.07 KG/M2 | OXYGEN SATURATION: 98 %

## 2021-10-07 DIAGNOSIS — I44.2 ATRIOVENTRICULAR BLOCK, COMPLETE: ICD-10-CM

## 2021-10-07 DIAGNOSIS — I47.20 VENTRICULAR TACHYCARDIA: ICD-10-CM

## 2021-10-07 DIAGNOSIS — R07.2 PRECORDIAL PAIN: ICD-10-CM

## 2021-10-07 DIAGNOSIS — Q21.0 VENTRICULAR SEPTAL DEFECT: ICD-10-CM

## 2021-10-07 DIAGNOSIS — G47.33 OBSTRUCTIVE SLEEP APNEA: ICD-10-CM

## 2021-10-07 DIAGNOSIS — Z95.0 PACEMAKER: ICD-10-CM

## 2021-10-07 DIAGNOSIS — Q90.9 DOWN SYNDROME: ICD-10-CM

## 2021-10-07 DIAGNOSIS — Q24.9 CONGENITAL HEART DISEASE IN ADULT: ICD-10-CM

## 2021-10-07 DIAGNOSIS — E03.9 ACQUIRED HYPOTHYROIDISM: ICD-10-CM

## 2021-10-07 PROCEDURE — 93005 ELECTROCARDIOGRAM TRACING: CPT

## 2021-10-07 PROCEDURE — 93005 ELECTROCARDIOGRAM TRACING: CPT | Mod: PBBFAC | Performed by: INTERNAL MEDICINE

## 2021-10-07 PROCEDURE — 99214 PR OFFICE/OUTPT VISIT, EST, LEVL IV, 30-39 MIN: ICD-10-PCS | Mod: S$PBB,25,, | Performed by: INTERNAL MEDICINE

## 2021-10-07 PROCEDURE — 99213 OFFICE O/P EST LOW 20 MIN: CPT | Mod: PBBFAC | Performed by: INTERNAL MEDICINE

## 2021-10-07 PROCEDURE — 93010 ELECTROCARDIOGRAM REPORT: CPT | Mod: ,,, | Performed by: INTERNAL MEDICINE

## 2021-10-07 PROCEDURE — 93010 EKG 12-LEAD: ICD-10-PCS | Mod: ,,, | Performed by: INTERNAL MEDICINE

## 2021-10-07 PROCEDURE — 99999 PR PBB SHADOW E&M-EST. PATIENT-LVL III: CPT | Mod: PBBFAC,,, | Performed by: INTERNAL MEDICINE

## 2021-10-07 PROCEDURE — 99214 OFFICE O/P EST MOD 30 MIN: CPT | Mod: S$PBB,25,, | Performed by: INTERNAL MEDICINE

## 2021-10-07 PROCEDURE — 99999 PR PBB SHADOW E&M-EST. PATIENT-LVL III: ICD-10-PCS | Mod: PBBFAC,,, | Performed by: INTERNAL MEDICINE

## 2021-10-08 ENCOUNTER — TELEPHONE (OUTPATIENT)
Dept: ENDOCRINOLOGY | Facility: CLINIC | Age: 36
End: 2021-10-08

## 2021-10-08 DIAGNOSIS — E03.8 SUBCLINICAL HYPOTHYROIDISM: Primary | ICD-10-CM

## 2021-10-08 LAB
25(OH)D3 SERPL-MCNC: 45 NG/ML (ref 30–100)
FREE T4: 1.5 NANOGRAM/DL (ref 0.9–1.7)
TSH SERPL DL<=0.005 MIU/L-ACNC: <0.1 UIL/ML (ref 0.35–4)

## 2021-10-08 RX ORDER — LEVOTHYROXINE SODIUM 75 UG/1
75 TABLET ORAL
Qty: 90 TABLET | Refills: 3
Start: 2021-10-08 | End: 2022-08-15

## 2022-01-12 ENCOUNTER — TELEPHONE (OUTPATIENT)
Dept: CARDIOLOGY | Facility: CLINIC | Age: 37
End: 2022-01-12
Payer: MEDICARE

## 2022-01-12 NOTE — TELEPHONE ENCOUNTER
Patient's mother notified.    ----- Message from Madison Luciano MD sent at 1/11/2022  4:32 PM CST -----  Contact: Lay Matamoros (Mother)  Take both.    Madison Luciano M.D.    ----- Message -----  From: Christen Reed  Sent: 1/11/2022  11:01 AM CST  To: Madison Luciano MD    Patient is on Levofloxacin 500mg once daily for 10 days, started 01/08/22. Patient is having 2 teeth's extracted on 01/13. She takes Clindamycin 300 mg 2 1 hour prior to dental work. Should she take both antibiotics or stop one, prior. Please advise.      ----- Message -----  From: Domi Rodriguez  Sent: 1/11/2022   9:54 AM CST  To: [facility] Wallace Staff    Type: Call Back    Who called: Lay Matamoros (Mother)    What is the request in detail:Lay Matamoros (Mother)   is requesting a call back. She states that there are a few medications she would like to dicuss with the nurse. Please advise.     Can the clinic reply by [facility]? No    Would the patient rather a call back or a response via [facility]? Call back     Best call back number: [phone]     Additional Information:

## 2022-01-31 ENCOUNTER — CLINICAL SUPPORT (OUTPATIENT)
Dept: CARDIOLOGY | Facility: CLINIC | Age: 37
End: 2022-01-31
Payer: MEDICARE

## 2022-01-31 DIAGNOSIS — Z95.0 PACEMAKER: Primary | ICD-10-CM

## 2022-01-31 DIAGNOSIS — I44.2 ATRIOVENTRICULAR BLOCK, COMPLETE: ICD-10-CM

## 2022-01-31 DIAGNOSIS — I47.20 VENTRICULAR TACHYCARDIA: ICD-10-CM

## 2022-01-31 PROCEDURE — 93294 PR INTERROG EVAL, REMOTE, UP TO 90 DAYS,PACEMAKER: ICD-10-PCS | Mod: ,,, | Performed by: INTERNAL MEDICINE

## 2022-01-31 PROCEDURE — 93294 REM INTERROG EVL PM/LDLS PM: CPT | Mod: ,,, | Performed by: INTERNAL MEDICINE

## 2022-01-31 PROCEDURE — 99499 NO LOS: ICD-10-PCS | Mod: S$PBB,,, | Performed by: INTERNAL MEDICINE

## 2022-01-31 PROCEDURE — 99499 UNLISTED E&M SERVICE: CPT | Mod: S$PBB,,, | Performed by: INTERNAL MEDICINE

## 2022-02-02 NOTE — PROGRESS NOTES
The remote monitoring information downloaded from the pacemaker company's web site was reviewed in detail and the information was assessed in the context of the patient's clinical status. The downlowded information will be scanned into this encounter.    Madison Luciano M.D.

## 2022-03-10 ENCOUNTER — OFFICE VISIT (OUTPATIENT)
Dept: CARDIOLOGY | Facility: CLINIC | Age: 37
End: 2022-03-10
Attending: INTERNAL MEDICINE
Payer: MEDICARE

## 2022-03-10 VITALS
OXYGEN SATURATION: 99 % | SYSTOLIC BLOOD PRESSURE: 100 MMHG | DIASTOLIC BLOOD PRESSURE: 58 MMHG | WEIGHT: 118.63 LBS | HEART RATE: 62 BPM | HEIGHT: 59 IN | BODY MASS INDEX: 23.92 KG/M2

## 2022-03-10 DIAGNOSIS — I47.20 VENTRICULAR TACHYCARDIA: ICD-10-CM

## 2022-03-10 DIAGNOSIS — Q21.0 VENTRICULAR SEPTAL DEFECT: ICD-10-CM

## 2022-03-10 DIAGNOSIS — Q24.9 CONGENITAL HEART DISEASE IN ADULT: ICD-10-CM

## 2022-03-10 DIAGNOSIS — I47.20 VT (VENTRICULAR TACHYCARDIA): ICD-10-CM

## 2022-03-10 DIAGNOSIS — I44.2 ATRIOVENTRICULAR BLOCK, COMPLETE: ICD-10-CM

## 2022-03-10 DIAGNOSIS — Q90.9 DOWN SYNDROME: ICD-10-CM

## 2022-03-10 DIAGNOSIS — R07.2 PRECORDIAL PAIN: ICD-10-CM

## 2022-03-10 DIAGNOSIS — Z95.0 PACEMAKER: ICD-10-CM

## 2022-03-10 PROCEDURE — 99214 OFFICE O/P EST MOD 30 MIN: CPT | Mod: S$PBB,,, | Performed by: INTERNAL MEDICINE

## 2022-03-10 PROCEDURE — 93280 PM DEVICE PROGR EVAL DUAL: CPT | Mod: PBBFAC | Performed by: INTERNAL MEDICINE

## 2022-03-10 PROCEDURE — 99999 PR PBB SHADOW E&M-EST. PATIENT-LVL III: ICD-10-PCS | Mod: PBBFAC,,, | Performed by: INTERNAL MEDICINE

## 2022-03-10 PROCEDURE — 93280 PM DEVICE PROGR EVAL DUAL: CPT | Mod: 26,S$PBB,, | Performed by: INTERNAL MEDICINE

## 2022-03-10 PROCEDURE — 99214 PR OFFICE/OUTPT VISIT, EST, LEVL IV, 30-39 MIN: ICD-10-PCS | Mod: S$PBB,,, | Performed by: INTERNAL MEDICINE

## 2022-03-10 PROCEDURE — 99213 OFFICE O/P EST LOW 20 MIN: CPT | Mod: PBBFAC | Performed by: INTERNAL MEDICINE

## 2022-03-10 PROCEDURE — 93280 PR PROGRAM EVAL (IN PERSON) IMPLANT DEVICE,PACEMAKER,2 LEAD: ICD-10-PCS | Mod: 26,S$PBB,, | Performed by: INTERNAL MEDICINE

## 2022-03-10 PROCEDURE — 99999 PR PBB SHADOW E&M-EST. PATIENT-LVL III: CPT | Mod: PBBFAC,,, | Performed by: INTERNAL MEDICINE

## 2022-03-10 RX ORDER — CLINDAMYCIN HYDROCHLORIDE 300 MG/1
600 CAPSULE ORAL ONCE AS NEEDED
Qty: 10 CAPSULE | Refills: 3 | Status: SHIPPED | OUTPATIENT
Start: 2022-03-10 | End: 2023-05-08

## 2022-03-10 RX ORDER — METOPROLOL SUCCINATE 25 MG/1
25 TABLET, EXTENDED RELEASE ORAL DAILY
Qty: 90 TABLET | Refills: 3 | Status: SHIPPED | OUTPATIENT
Start: 2022-03-10 | End: 2023-02-06

## 2022-03-10 RX ORDER — ASPIRIN 81 MG/1
81 TABLET ORAL DAILY
Qty: 90 TABLET | Refills: 3 | Status: SHIPPED | OUTPATIENT
Start: 2022-03-10 | End: 2023-03-27 | Stop reason: SDUPTHER

## 2022-03-10 NOTE — PROGRESS NOTES
Subjective:     Kan Matamoros is a 36 y.o. female with Down's syndrome. In 1986 she had a ventricular septal defect repaired with a Dacron patch and a patent ductus ligated. She developed complete heart block and she received a Medtronic pacemaker on 11/17/2016. She had issues with orthstatic hypotension and used to be on midodrine. Ventricular tachycardia appears to have been seen on one interrogation of her pacemaker. On 12/28/2020 her pacemaker was interrogated. There had been 5 runs of nonsustained ventricular tachycardia with the longest being a 9 beat run. On 4/12/2021 she felt anterior chest wall pain that appears to be over scar of the sternotomy. She went to the emergency room at [facility]. A chest X ray was negative. She left before being seen to come and see me. She has had no further pain. She had no pain walking over here from the garage. In 5/2021 she was diagnosed with obstructive sleep apnea and began using continuous positive airway pressure therapy . There were some issues about getting a mask that fits. No exertional chest pain or clear exertional shortness of breath. No palpitations or weak spells. Some exertional fatigue. Feeling well overall.        Heart Problem  This is a chronic problem. The current episode started more than 1 year ago. Pertinent negatives include no abdominal pain, anorexia, arthralgias, change in bowel habit, chest pain, chills, congestion, coughing, diaphoresis, fatigue, fever, headaches, joint swelling, myalgias, nausea, neck pain, numbness, rash, sore throat, swollen glands, urinary symptoms, vertigo, visual change, vomiting or weakness.   Chest Pain   Associated symptoms include malaise/fatigue. Pertinent negatives include no abdominal pain, back pain, claudication, cough, diaphoresis, dizziness, exertional chest pressure, fever, headaches, hemoptysis, irregular heartbeat, leg pain, lower extremity edema, nausea, near-syncope, numbness,  orthopnea, palpitations, PND, shortness of breath, sputum production, syncope, vomiting or weakness.   Pertinent negatives for past medical history include no muscle weakness.       Review of Systems   Constitutional: Positive for malaise/fatigue. Negative for chills, diaphoresis, fatigue and fever.   HENT: Negative for congestion, nosebleeds and sore throat.    Eyes: Negative for double vision, vision loss in left eye and vision loss in right eye.   Cardiovascular: Negative for chest pain, claudication, dyspnea on exertion, irregular heartbeat, leg swelling, near-syncope, orthopnea, palpitations, paroxysmal nocturnal dyspnea and syncope.   Respiratory: Negative for cough, hemoptysis, shortness of breath, sputum production and wheezing.    Endocrine: Negative for cold intolerance and heat intolerance.   Hematologic/Lymphatic: Negative for bleeding problem. Does not bruise/bleed easily.   Skin: Negative for color change and rash.   Musculoskeletal: Negative for arthralgias, back pain, falls, joint swelling, muscle weakness, myalgias and neck pain.   Gastrointestinal: Negative for abdominal pain, anorexia, change in bowel habit, heartburn, hematemesis, hematochezia, hemorrhoids, jaundice, melena, nausea and vomiting.   Genitourinary: Negative for dysuria and hematuria.   Neurological: Negative for dizziness, focal weakness, headaches, light-headedness, loss of balance, numbness, vertigo and weakness.   Psychiatric/Behavioral: Negative for altered mental status, depression and memory loss. The patient is not nervous/anxious.    Allergic/Immunologic: Negative for hives and persistent infections.       Current Outpatient Medications on File Prior to Visit   Medication Sig Dispense Refill    ascorbic acid, vitamin C, (VITAMIN C) 500 MG tablet Take 500 mg by mouth once daily.      aspirin (ECOTRIN) 81 MG EC tablet Take 81 mg by mouth once daily.      b complex vitamins capsule Take 1 capsule by mouth once daily.       "cholecalciferol, vitamin D3, (VITAMIN D3) 25 mcg (1,000 unit) capsule Take 1,000 Units by mouth once daily.      clindamycin (CLEOCIN) 300 MG capsule       metoprolol succinate (TOPROL-XL) 25 MG 24 hr tablet TAKE 1 TABLET (25 MG TOTAL) BY MOUTH ONCE DAILY. 30 tablet 11    MULTIVIT,CALC,MINS/IRON/FOLIC (ONE-A-DAY WOMENS FORMULA ORAL) Take by mouth.      multivitamin capsule Take 1 capsule by mouth.      SYNTHROID 75 mcg tablet Take 1 tablet (75 mcg total) by mouth before breakfast. Take only half tablet on Mondays and Fridays. Full tablet other five days of the week. (6 tabs/week) 90 tablet 3    midodrine (PROAMATINE) 5 MG Tab Take 5 mg by mouth 2 (two) times daily with meals.      terbinafine HCL (LAMISIL) 250 mg tablet        No current facility-administered medications on file prior to visit.       BP (!) 100/58 (BP Location: Right arm, Patient Position: Sitting)   Pulse 62   Ht 4' 11" (1.499 m)   Wt 53.8 kg (118 lb 9.7 oz)   SpO2 99%   BMI 23.96 kg/m²       Objective:     Physical Exam  Constitutional:       General: She is not in acute distress.     Appearance: Normal appearance. She is well-developed. She is not toxic-appearing or diaphoretic.   HENT:      Head: Normocephalic and atraumatic.      Nose: Nose normal.   Eyes:      General:         Right eye: No discharge.         Left eye: No discharge.      Conjunctiva/sclera:      Right eye: Right conjunctiva is not injected.      Left eye: Left conjunctiva is not injected.      Pupils: Pupils are equal.      Right eye: Pupil is round.      Left eye: Pupil is round.   Neck:      Thyroid: No thyromegaly.      Vascular: No carotid bruit or JVD.   Cardiovascular:      Rate and Rhythm: Normal rate and regular rhythm.  No extrasystoles are present.     Chest Wall: PMI is not displaced.      Pulses:           Radial pulses are 2+ on the right side and 2+ on the left side.        Femoral pulses are 2+ on the right side and 2+ on the left side.       " Dorsalis pedis pulses are 2+ on the right side and 2+ on the left side.        Posterior tibial pulses are 2+ on the right side and 2+ on the left side.      Heart sounds: S1 normal and S2 normal. Murmur heard.    Midsystolic murmur is present with a grade of 2/6 at the upper right sternal border.    No gallop.   Pulmonary:      Effort: Pulmonary effort is normal.      Breath sounds: Normal breath sounds.   Chest:      Comments: Sore over chest wall.  Pacemaker right upper chest.  Abdominal:      Palpations: Abdomen is soft.      Tenderness: There is no abdominal tenderness.   Musculoskeletal:      Cervical back: Neck supple.      Right ankle: No swelling, deformity or ecchymosis.      Left ankle: No swelling, deformity or ecchymosis.   Lymphadenopathy:      Head:      Right side of head: No submandibular adenopathy.      Left side of head: No submandibular adenopathy.      Cervical: No cervical adenopathy.   Skin:     General: Skin is warm and dry.      Findings: No rash.   Neurological:      Mental Status: She is alert and oriented to person, place, and time. She is not disoriented.      Cranial Nerves: No cranial nerve deficit.   Psychiatric:         Speech: Speech normal.         Behavior: Behavior normal.         Thought Content: Thought content normal.         Judgment: Judgment normal.         Assessment:     1. Congenital heart disease in adult    2. Ventricular septal defect    3. Pacemaker    4. Atrioventricular block, complete    5. Ventricular tachycardia    6. Precordial pain    7. Down syndrome        Plan:     1. Congenital Heart Disease   1986: VSD Repair with patch and Ligation of Patent Truncus.   3/2/2021: Echo: Normal left ventricular size and systolic function. EF 65%. No VSD. Mildly dilated RA. Moderate TR. Pacemaker.   Appears to be doing well.     2. Pacemaker   11/17/2016: Medtronic DDDR pacemaker.   12/28/2020: Programmed & Fine: 70% A paced. 100% V paced. 5 episodes of NSVT - longest 9  seconds at 170 bpm. Rate responsiveness was increased. Estimated JAYLIN 2025.   4/8/2021: Programmed & Fine: 93% A paced. 100% V paved. No VT. Rate responsiveness was increased. Estimated JAYLIN 2026.   7/8/2021: Programmed & Fine: 90% A paced. 100% V paced. Two NSVT episodes: 1 sec and 2 sec at rate 165 bpm. Estimated JAYLIN 2026.    3/10/2022: Programmed & Fine: 71% A paced. 100% V paced. One NSVT episode of 15 beats. Estimated JAYLIN 2026. 9/2022: Plan next interrogation. Carelink is on.    3. Atrioventricular Block, Complete   11/17/2016: Received pacemaker.    4. Ventricular Tachycardia   12/28/2020: Programmed & Fine: 5 episodes of NSVT - longest 9 seconds at 170 bpm.   The episode of VT occurred when she was on metoprolol.   12/28/2020: Metoprolol 25 mg Q24 was resumed.   On metoprolol 25 mg Q24.    5. Chest Pain   4/12/2021: EJ: ER: Atypical chest pain.   Reassurance for now.    6. Orthostatic Hypotension   Mentioned in chart.   Used to be given midodrine.      7. Down's Syndrome    8. Hypothyroidism   2018: Diagnosed.   On levothyroxine 50 mcg Q24.   Dr. Carlin Badillo.    9. Obstructive Sleep Apnea   5/2021: Diagnosed with obstructive sleep apnea. Using CPAP.    10. Primary Care   Dr. Domi Porter.     F/u 6 months.    Madison Luciano M.D.

## 2022-03-24 ENCOUNTER — TELEPHONE (OUTPATIENT)
Dept: ENDOCRINOLOGY | Facility: CLINIC | Age: 37
End: 2022-03-24
Payer: MEDICARE

## 2022-05-17 ENCOUNTER — TELEPHONE (OUTPATIENT)
Dept: CARDIOLOGY | Facility: CLINIC | Age: 37
End: 2022-05-17
Payer: MEDICARE
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
## 2022-05-20 ENCOUNTER — TELEPHONE (OUTPATIENT)
Dept: CARDIOLOGY | Facility: CLINIC | Age: 37
End: 2022-05-20
Payer: MEDICARE

## 2022-05-20 NOTE — TELEPHONE ENCOUNTER
"Pt's mother notified last Medtronic device ck 3/10/22.    ----- Message from Domi Rodriguez sent at 5/20/2022 10:38 AM CDT -----  Contact: Chad Matamoros (Mother)  Type: Call Back      Who called: Chad Matamoros (Mother)      What is the request in detail: Chad Matamoros (Mother) is requesting a call back. She would like to speak with the nurse in regards to "Kan's transmission". Please advise.       Can the clinic reply by [facility]? No      Would the patient rather a call back or a response via [facility]? Call back       Best call back number: [phone]      Additional Information:         "

## 2022-06-10 ENCOUNTER — OFFICE VISIT (OUTPATIENT)
Dept: ENDOCRINOLOGY | Facility: CLINIC | Age: 37
End: 2022-06-10
Payer: MEDICARE

## 2022-06-10 VITALS
WEIGHT: 122 LBS | HEART RATE: 62 BPM | BODY MASS INDEX: 24.6 KG/M2 | OXYGEN SATURATION: 96 % | HEIGHT: 59 IN | RESPIRATION RATE: 18 BRPM | SYSTOLIC BLOOD PRESSURE: 90 MMHG | DIASTOLIC BLOOD PRESSURE: 60 MMHG

## 2022-06-10 DIAGNOSIS — E55.9 VITAMIN D DEFICIENCY: ICD-10-CM

## 2022-06-10 DIAGNOSIS — E03.8 SUBCLINICAL HYPOTHYROIDISM: Primary | ICD-10-CM

## 2022-06-10 DIAGNOSIS — Q90.9 DOWN SYNDROME: ICD-10-CM

## 2022-06-10 DIAGNOSIS — E03.9 ACQUIRED HYPOTHYROIDISM: ICD-10-CM

## 2022-06-10 DIAGNOSIS — Q24.9 CONGENITAL HEART DISEASE IN ADULT: ICD-10-CM

## 2022-06-10 PROBLEM — R63.5 WEIGHT GAIN: Status: RESOLVED | Noted: 2020-08-19 | Resolved: 2022-06-10

## 2022-06-10 PROCEDURE — 99999 PR PBB SHADOW E&M-EST. PATIENT-LVL III: CPT | Mod: PBBFAC,,, | Performed by: INTERNAL MEDICINE

## 2022-06-10 PROCEDURE — 99213 PR OFFICE/OUTPT VISIT, EST, LEVL III, 20-29 MIN: ICD-10-PCS | Mod: S$PBB,,, | Performed by: INTERNAL MEDICINE

## 2022-06-10 PROCEDURE — 99213 OFFICE O/P EST LOW 20 MIN: CPT | Mod: S$PBB,,, | Performed by: INTERNAL MEDICINE

## 2022-06-10 PROCEDURE — 99999 PR PBB SHADOW E&M-EST. PATIENT-LVL III: ICD-10-PCS | Mod: PBBFAC,,, | Performed by: INTERNAL MEDICINE

## 2022-06-10 PROCEDURE — 99213 OFFICE O/P EST LOW 20 MIN: CPT | Mod: PBBFAC | Performed by: INTERNAL MEDICINE

## 2022-06-10 NOTE — ASSESSMENT & PLAN NOTE
Currently on Synthroid 75 mcg daily. Check TFTs now.    She is taking it appropriately first thing in the AM, waiting 30 minutes prior to eating/drinking.

## 2022-06-10 NOTE — ASSESSMENT & PLAN NOTE
Endocrinopathies associated with Down syndrome include hypothyroidism and diabetes (positively correlated with type 1 diabetes). Screening A1c was normal in 2018 and 2020. Serum glucose normal on labs in 2019.
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
## 2022-08-22 ENCOUNTER — TELEPHONE (OUTPATIENT)
Dept: CARDIOLOGY | Facility: CLINIC | Age: 37
End: 2022-08-22
Payer: MEDICARE

## 2022-08-22 NOTE — TELEPHONE ENCOUNTER
Returned call to patient. Scheduled appointment with Dr Luciano.    ----- Message from Johanna Victor sent at 8/22/2022  9:42 AM CDT -----  Regarding: ER follow up  Name of Who is Calling: Chad, mother           What is the request in detail: Chad is requesting a call back to schedule an ER follow up.           Can the clinic reply by [facility]: No           What Number to Call Back if not in [facility]: [phone]

## 2022-08-25 ENCOUNTER — OFFICE VISIT (OUTPATIENT)
Dept: CARDIOLOGY | Facility: CLINIC | Age: 37
End: 2022-08-25
Attending: INTERNAL MEDICINE
Payer: MEDICARE

## 2022-08-25 VITALS
HEART RATE: 71 BPM | DIASTOLIC BLOOD PRESSURE: 64 MMHG | SYSTOLIC BLOOD PRESSURE: 102 MMHG | BODY MASS INDEX: 24.29 KG/M2 | HEIGHT: 59 IN | OXYGEN SATURATION: 98 % | WEIGHT: 120.5 LBS

## 2022-08-25 DIAGNOSIS — Q21.0 VENTRICULAR SEPTAL DEFECT: ICD-10-CM

## 2022-08-25 DIAGNOSIS — Z95.0 PACEMAKER: ICD-10-CM

## 2022-08-25 DIAGNOSIS — I44.2 ATRIOVENTRICULAR BLOCK, COMPLETE: ICD-10-CM

## 2022-08-25 DIAGNOSIS — G47.33 OBSTRUCTIVE SLEEP APNEA: ICD-10-CM

## 2022-08-25 DIAGNOSIS — Q90.9 DOWN SYNDROME: ICD-10-CM

## 2022-08-25 DIAGNOSIS — E03.9 ACQUIRED HYPOTHYROIDISM: ICD-10-CM

## 2022-08-25 DIAGNOSIS — I47.20 VENTRICULAR TACHYCARDIA: ICD-10-CM

## 2022-08-25 DIAGNOSIS — Q24.9 CONGENITAL HEART DISEASE IN ADULT: ICD-10-CM

## 2022-08-25 DIAGNOSIS — R07.2 PRECORDIAL PAIN: ICD-10-CM

## 2022-08-25 PROCEDURE — 99999 PR PBB SHADOW E&M-EST. PATIENT-LVL III: ICD-10-PCS | Mod: PBBFAC,,, | Performed by: INTERNAL MEDICINE

## 2022-08-25 PROCEDURE — 99999 PR PBB SHADOW E&M-EST. PATIENT-LVL III: CPT | Mod: PBBFAC,,, | Performed by: INTERNAL MEDICINE

## 2022-08-25 PROCEDURE — 99214 OFFICE O/P EST MOD 30 MIN: CPT | Mod: S$PBB,,, | Performed by: INTERNAL MEDICINE

## 2022-08-25 PROCEDURE — 99213 OFFICE O/P EST LOW 20 MIN: CPT | Mod: PBBFAC | Performed by: INTERNAL MEDICINE

## 2022-08-25 PROCEDURE — 99214 PR OFFICE/OUTPT VISIT, EST, LEVL IV, 30-39 MIN: ICD-10-PCS | Mod: S$PBB,,, | Performed by: INTERNAL MEDICINE

## 2022-08-25 NOTE — PROGRESS NOTES
"  Subjective:     Kan Matamoros is a 37 y.o. female with Down's syndrome. In 1986 she had a ventricular septal defect repaired with a Dacron patch and a patent ductus ligated. She developed complete heart block and she received a Medtronic pacemaker on 11/17/2016. She had issues with orthstatic hypotension and used to be on midodrine. Ventricular tachycardia appears to have been seen on one interrogation of her pacemaker. On 12/28/2020 her pacemaker was interrogated. There had been 5 runs of nonsustained ventricular tachycardia with the longest being a 9 beat run. On 4/12/2021 she felt anterior chest wall pain that appeared to have been over scar of the sternotomy. She went to the emergency room at [facility]. A chest X ray was negative. She left before being seen to come and see me. She had no further pain. She had no pain walking over here from the garage. In 5/2021 she was diagnosed with obstructive sleep apnea and began using continuous positive airway pressure therapy. There were some issues about getting a mask that fit. She is on CPAP. On 8/20/2022 she was seen in the emergency room at [facility] for atypical chest pain. She had a CTA of her chest that was "negative"\. She was released and has had no further chest pain. She had been coughing for a few days before the episode of chest pain. No exertional chest pain or clear exertional shortness of breath. No palpitations or weak spells. Some exertional fatigue. Feeling well overall.        Heart Problem  This is a chronic problem. The current episode started more than 1 year ago. Associated symptoms include chest pain. Pertinent negatives include no abdominal pain, anorexia, arthralgias, change in bowel habit, chills, congestion, coughing, diaphoresis, fatigue, fever, headaches, joint swelling, myalgias, nausea, neck pain, numbness, rash, sore throat, swollen glands, urinary symptoms, vertigo, visual change, vomiting or " weakness.   Chest Pain   Associated symptoms include malaise/fatigue. Pertinent negatives include no abdominal pain, back pain, claudication, cough, diaphoresis, dizziness, exertional chest pressure, fever, headaches, hemoptysis, irregular heartbeat, leg pain, lower extremity edema, nausea, near-syncope, numbness, orthopnea, palpitations, PND, shortness of breath, sputum production, syncope, vomiting or weakness.   Pertinent negatives for past medical history include no muscle weakness.       Review of Systems   Constitutional: Positive for malaise/fatigue. Negative for chills, diaphoresis, fatigue and fever.   HENT: Negative for congestion, nosebleeds and sore throat.    Eyes: Negative for double vision, vision loss in left eye and vision loss in right eye.   Cardiovascular: Positive for chest pain. Negative for claudication, dyspnea on exertion, irregular heartbeat, leg swelling, near-syncope, orthopnea, palpitations, paroxysmal nocturnal dyspnea and syncope.   Respiratory: Negative for cough, hemoptysis, shortness of breath, sputum production and wheezing.    Endocrine: Negative for cold intolerance and heat intolerance.   Hematologic/Lymphatic: Negative for bleeding problem. Does not bruise/bleed easily.   Skin: Negative for color change and rash.   Musculoskeletal: Negative for arthralgias, back pain, falls, joint swelling, muscle weakness, myalgias and neck pain.   Gastrointestinal: Negative for abdominal pain, anorexia, change in bowel habit, heartburn, hematemesis, hematochezia, hemorrhoids, jaundice, melena, nausea and vomiting.   Genitourinary: Negative for dysuria and hematuria.   Neurological: Negative for dizziness, focal weakness, headaches, light-headedness, loss of balance, numbness, vertigo and weakness.   Psychiatric/Behavioral: Negative for altered mental status, depression and memory loss. The patient is not nervous/anxious.    Allergic/Immunologic: Negative for hives and persistent infections.  "      Current Outpatient Medications on File Prior to Visit   Medication Sig Dispense Refill    ascorbic acid, vitamin C, (VITAMIN C) 500 MG tablet Take 500 mg by mouth once daily.      aspirin (ECOTRIN) 81 MG EC tablet Take 1 tablet (81 mg total) by mouth once daily. 90 tablet 3    b complex vitamins capsule Take 1 capsule by mouth once daily.      cholecalciferol, vitamin D3, (VITAMIN D3) 25 mcg (1,000 unit) capsule Take 1,000 Units by mouth once daily.      metoprolol succinate (TOPROL-XL) 25 MG 24 hr tablet Take 1 tablet (25 mg total) by mouth once daily. 90 tablet 3    MULTIVIT,CALC,MINS/IRON/FOLIC (ONE-A-DAY WOMENS FORMULA ORAL) Take by mouth.      multivitamin capsule Take 1 capsule by mouth.      SYNTHROID 75 mcg tablet TAKE 1 TABLET (75 MCG TOTAL) BY MOUTH BEFORE BREAKFAST. 90 tablet 3    midodrine (PROAMATINE) 5 MG Tab Take 5 mg by mouth 2 (two) times daily with meals.      terbinafine HCL (LAMISIL) 250 mg tablet        No current facility-administered medications on file prior to visit.       /64 (BP Location: Right arm, Patient Position: Sitting)   Pulse 71   Ht 4' 11" (1.499 m)   Wt 54.6 kg (120 lb 7.7 oz)   SpO2 98%   BMI 24.33 kg/m²       Objective:     Physical Exam  Constitutional:       General: She is not in acute distress.     Appearance: Normal appearance. She is well-developed. She is not toxic-appearing or diaphoretic.   HENT:      Head: Normocephalic and atraumatic.      Nose: Nose normal.   Eyes:      General:         Right eye: No discharge.         Left eye: No discharge.      Conjunctiva/sclera:      Right eye: Right conjunctiva is not injected.      Left eye: Left conjunctiva is not injected.      Pupils: Pupils are equal.      Right eye: Pupil is round.      Left eye: Pupil is round.   Neck:      Thyroid: No thyromegaly.      Vascular: No carotid bruit or JVD.   Cardiovascular:      Rate and Rhythm: Normal rate and regular rhythm.  No extrasystoles are present.     " Chest Wall: PMI is not displaced.      Pulses:           Radial pulses are 2+ on the right side and 2+ on the left side.        Femoral pulses are 2+ on the right side and 2+ on the left side.       Dorsalis pedis pulses are 2+ on the right side and 2+ on the left side.        Posterior tibial pulses are 2+ on the right side and 2+ on the left side.      Heart sounds: S1 normal and S2 normal. Murmur heard.    Midsystolic murmur is present with a grade of 2/6 at the upper right sternal border.    No gallop.   Pulmonary:      Effort: Pulmonary effort is normal.      Breath sounds: Normal breath sounds.   Chest:      Comments: Sore over chest wall.  Pacemaker right upper chest.  Abdominal:      Palpations: Abdomen is soft.      Tenderness: There is no abdominal tenderness.   Musculoskeletal:      Cervical back: Neck supple.      Right ankle: No swelling, deformity or ecchymosis.      Left ankle: No swelling, deformity or ecchymosis.   Lymphadenopathy:      Head:      Right side of head: No submandibular adenopathy.      Left side of head: No submandibular adenopathy.      Cervical: No cervical adenopathy.   Skin:     General: Skin is warm and dry.      Findings: No rash.   Neurological:      General: No focal deficit present.      Mental Status: She is alert and oriented to person, place, and time. She is not disoriented.      Cranial Nerves: No cranial nerve deficit.   Psychiatric:         Attention and Perception: Attention normal.         Mood and Affect: Mood and affect normal.         Speech: Speech normal.         Behavior: Behavior normal.         Thought Content: Thought content normal.         Judgment: Judgment normal.         Assessment:     1. Congenital heart disease in adult    2. Ventricular septal defect    3. Pacemaker    4. Atrioventricular block, complete    5. Ventricular tachycardia    6. Precordial pain    7. Down syndrome    8. Acquired hypothyroidism    9. Obstructive sleep apnea        Plan:      1. Congenital Heart Disease   1986: VSD Repair with patch and Ligation of Patent Truncus.   3/2/2021: Echo: Normal left ventricular size and systolic function. EF 65%. No VSD. Mildly dilated RA. Moderate TR. Pacemaker.   Appears to be doing well.     2. Pacemaker   11/17/2016: Medtronic DDDR pacemaker.   12/28/2020: Programmed & Fine: 70% A paced. 100% V paced. 5 episodes of NSVT - longest 9 seconds at 170 bpm. Rate responsiveness was increased. Estimated JAYLIN 2025.   4/8/2021: Programmed & Fine: 93% A paced. 100% V paved. No VT. Rate responsiveness was increased. Estimated JAYLIN 2026.   7/8/2021: Programmed & Fine: 90% A paced. 100% V paced. Two NSVT episodes: 1 sec and 2 sec at rate 165 bpm. Estimated JAYLIN 2026.    3/10/2022: Programmed & Fine: 71% A paced. 100% V paced. One NSVT episode of 15 beats. Estimated JAYLIN 2026. 9/2022: Plan next interrogation. Carelink is on.    3. Atrioventricular Block, Complete   11/17/2016: Received pacemaker.    4. Ventricular Tachycardia   12/28/2020: Programmed & Fine: 5 episodes of NSVT - longest 9 seconds at 170 bpm.   The episode of VT occurred when she was on metoprolol.   12/28/2020: Metoprolol 25 mg Q24 was resumed.   On metoprolol 25 mg Q24.    5. Chest Pain   4/12/2021: EJ: ER: Atypical chest pain.   8/20/2022: EJ: ER: Atypical chest pain. CTA was negative.   Reassurance for now.    6. Orthostatic Hypotension   Mentioned in chart.   Used to be given midodrine.      7. Down's Syndrome    8. Hypothyroidism   2018: Diagnosed.   On levothyroxine 50 mcg Q24.   Dr. Carlin Badillo.    9. Obstructive Sleep Apnea   5/2021: Diagnosed with obstructive sleep apnea. Using CPAP.    10. Primary Care   Dr. Domi Porter.     F/u 1 month.    Madison Luciano M.D.

## 2022-09-26 ENCOUNTER — OFFICE VISIT (OUTPATIENT)
Dept: CARDIOLOGY | Facility: CLINIC | Age: 37
End: 2022-09-26
Attending: INTERNAL MEDICINE
Payer: MEDICARE

## 2022-09-26 VITALS
SYSTOLIC BLOOD PRESSURE: 95 MMHG | BODY MASS INDEX: 24.88 KG/M2 | DIASTOLIC BLOOD PRESSURE: 48 MMHG | HEART RATE: 68 BPM | OXYGEN SATURATION: 98 % | WEIGHT: 123.44 LBS | HEIGHT: 59 IN

## 2022-09-26 DIAGNOSIS — I47.20 VENTRICULAR TACHYCARDIA: ICD-10-CM

## 2022-09-26 DIAGNOSIS — Q90.9 DOWN SYNDROME: ICD-10-CM

## 2022-09-26 DIAGNOSIS — I44.2 ATRIOVENTRICULAR BLOCK, COMPLETE: ICD-10-CM

## 2022-09-26 DIAGNOSIS — Q24.9 CONGENITAL HEART DISEASE IN ADULT: ICD-10-CM

## 2022-09-26 DIAGNOSIS — E03.9 ACQUIRED HYPOTHYROIDISM: ICD-10-CM

## 2022-09-26 DIAGNOSIS — R07.2 PRECORDIAL PAIN: ICD-10-CM

## 2022-09-26 DIAGNOSIS — Q21.0 VENTRICULAR SEPTAL DEFECT: ICD-10-CM

## 2022-09-26 DIAGNOSIS — Z95.0 PACEMAKER: ICD-10-CM

## 2022-09-26 PROCEDURE — 93280 PR PROGRAM EVAL (IN PERSON) IMPLANT DEVICE,PACEMAKER,2 LEAD: ICD-10-PCS | Mod: 26,S$PBB,, | Performed by: INTERNAL MEDICINE

## 2022-09-26 PROCEDURE — 93280 PM DEVICE PROGR EVAL DUAL: CPT | Mod: PBBFAC | Performed by: INTERNAL MEDICINE

## 2022-09-26 PROCEDURE — 99214 PR OFFICE/OUTPT VISIT, EST, LEVL IV, 30-39 MIN: ICD-10-PCS | Mod: S$PBB,,, | Performed by: INTERNAL MEDICINE

## 2022-09-26 PROCEDURE — 99999 PR PBB SHADOW E&M-EST. PATIENT-LVL III: ICD-10-PCS | Mod: PBBFAC,,, | Performed by: INTERNAL MEDICINE

## 2022-09-26 PROCEDURE — 99213 OFFICE O/P EST LOW 20 MIN: CPT | Mod: PBBFAC | Performed by: INTERNAL MEDICINE

## 2022-09-26 PROCEDURE — 99999 PR PBB SHADOW E&M-EST. PATIENT-LVL III: CPT | Mod: PBBFAC,,, | Performed by: INTERNAL MEDICINE

## 2022-09-26 PROCEDURE — 93280 PM DEVICE PROGR EVAL DUAL: CPT | Mod: 26,S$PBB,, | Performed by: INTERNAL MEDICINE

## 2022-09-26 PROCEDURE — 99214 OFFICE O/P EST MOD 30 MIN: CPT | Mod: S$PBB,,, | Performed by: INTERNAL MEDICINE

## 2022-09-26 NOTE — PROGRESS NOTES
"    Subjective:     Kan Matamoros is a 37 y.o. female with Down's syndrome. In 1986 she had a ventricular septal defect repaired with a Dacron patch and a patent ductus ligated. She developed complete heart block and she received a Medtronic pacemaker on 11/17/2016. She had issues with orthstatic hypotension and used to be on midodrine. Ventricular tachycardia appears to have been seen on one interrogation of her pacemaker. On 12/28/2020 her pacemaker was interrogated. There had been five runs of nonsustained ventricular tachycardia with the longest being a 9 beat run. On 4/12/2021 she felt anterior chest wall pain that appeared to have been over scar of the sternotomy. She went to the emergency room at [facility]. A chest X ray was negative. She left before being seen to come and see me. She had no further pain. She had no pain walking over here from the garage. In 5/2021 she was diagnosed with obstructive sleep apnea and began using continuous positive airway pressure therapy. There were some issues about getting a mask that fit. She is on continuous positive airway pressure therapy. On 8/20/2022 she was seen in the emergency room at [facility] for atypical chest pain. She had a computerized tomography angiographic study of her chest that was "negative". She was released and has had no further chest pain. She had been coughing for a few days before the episode of chest pain. No exertional chest pain or clear exertional shortness of breath. No palpitations or weak spells. Some exertional fatigue. Feeling well overall.        Chest Pain   Associated symptoms include malaise/fatigue. Pertinent negatives include no abdominal pain, back pain, claudication, cough, diaphoresis, dizziness, exertional chest pressure, fever, headaches, hemoptysis, irregular heartbeat, leg pain, lower extremity edema, nausea, near-syncope, numbness, orthopnea, palpitations, PND, shortness of breath, " sputum production, syncope, vomiting or weakness.   Pertinent negatives for past medical history include no muscle weakness.   Heart Problem  This is a chronic problem. The current episode started more than 1 year ago. Associated symptoms include chest pain. Pertinent negatives include no abdominal pain, anorexia, arthralgias, change in bowel habit, chills, congestion, coughing, diaphoresis, fatigue, fever, headaches, joint swelling, myalgias, nausea, neck pain, numbness, rash, sore throat, swollen glands, urinary symptoms, vertigo, visual change, vomiting or weakness.     Review of Systems   Constitutional: Positive for malaise/fatigue. Negative for chills, diaphoresis, fatigue and fever.   HENT:  Negative for congestion, nosebleeds and sore throat.    Eyes:  Negative for double vision, vision loss in left eye and vision loss in right eye.   Cardiovascular:  Positive for chest pain. Negative for claudication, dyspnea on exertion, irregular heartbeat, leg swelling, near-syncope, orthopnea, palpitations, paroxysmal nocturnal dyspnea and syncope.   Respiratory:  Negative for cough, hemoptysis, shortness of breath, sputum production and wheezing.    Endocrine: Negative for cold intolerance and heat intolerance.   Hematologic/Lymphatic: Negative for bleeding problem. Does not bruise/bleed easily.   Skin:  Negative for color change and rash.   Musculoskeletal:  Negative for arthralgias, back pain, falls, joint swelling, muscle weakness, myalgias and neck pain.   Gastrointestinal:  Negative for abdominal pain, anorexia, change in bowel habit, heartburn, hematemesis, hematochezia, hemorrhoids, jaundice, melena, nausea and vomiting.   Genitourinary:  Negative for dysuria and hematuria.   Neurological:  Negative for dizziness, focal weakness, headaches, light-headedness, loss of balance, numbness, vertigo and weakness.   Psychiatric/Behavioral:  Negative for altered mental status, depression and memory loss. The patient is  "not nervous/anxious.    Allergic/Immunologic: Negative for hives and persistent infections.       Current Outpatient Medications on File Prior to Visit   Medication Sig Dispense Refill    ascorbic acid, vitamin C, (VITAMIN C) 500 MG tablet Take 500 mg by mouth once daily.      aspirin (ECOTRIN) 81 MG EC tablet Take 1 tablet (81 mg total) by mouth once daily. 90 tablet 3    b complex vitamins capsule Take 1 capsule by mouth once daily.      cholecalciferol, vitamin D3, (VITAMIN D3) 25 mcg (1,000 unit) capsule Take 1,000 Units by mouth once daily.      metoprolol succinate (TOPROL-XL) 25 MG 24 hr tablet Take 1 tablet (25 mg total) by mouth once daily. 90 tablet 3    midodrine (PROAMATINE) 5 MG Tab Take 5 mg by mouth 2 (two) times daily with meals.      MULTIVIT,CALC,MINS/IRON/FOLIC (ONE-A-DAY WOMENS FORMULA ORAL) Take by mouth.      multivitamin capsule Take 1 capsule by mouth.      SYNTHROID 75 mcg tablet TAKE 1 TABLET (75 MCG TOTAL) BY MOUTH BEFORE BREAKFAST. 90 tablet 3    terbinafine HCL (LAMISIL) 250 mg tablet        No current facility-administered medications on file prior to visit.       BP (!) 95/48 (BP Location: Left arm, Patient Position: Sitting, BP Method: Large (Manual))   Pulse 68   Ht 4' 11" (1.499 m)   Wt 56 kg (123 lb 7.3 oz)   SpO2 98%   BMI 24.94 kg/m²       Objective:     Physical Exam  Constitutional:       General: She is not in acute distress.     Appearance: Normal appearance. She is well-developed. She is not toxic-appearing or diaphoretic.   HENT:      Head: Normocephalic and atraumatic.      Nose: Nose normal.   Eyes:      General:         Right eye: No discharge.         Left eye: No discharge.      Conjunctiva/sclera:      Right eye: Right conjunctiva is not injected.      Left eye: Left conjunctiva is not injected.      Pupils: Pupils are equal.      Right eye: Pupil is round.      Left eye: Pupil is round.   Neck:      Thyroid: No thyromegaly.      Vascular: No carotid bruit or " JVD.   Cardiovascular:      Rate and Rhythm: Normal rate and regular rhythm. No extrasystoles are present.     Chest Wall: PMI is not displaced.      Pulses:           Radial pulses are 2+ on the right side and 2+ on the left side.        Femoral pulses are 2+ on the right side and 2+ on the left side.       Dorsalis pedis pulses are 2+ on the right side and 2+ on the left side.        Posterior tibial pulses are 2+ on the right side and 2+ on the left side.      Heart sounds: S1 normal and S2 normal. Murmur heard.   Midsystolic murmur is present with a grade of 2/6 at the upper right sternal border.     No gallop.   Pulmonary:      Effort: Pulmonary effort is normal.      Breath sounds: Normal breath sounds.   Chest:      Comments: Sore over chest wall.  Pacemaker right upper chest.  Abdominal:      Palpations: Abdomen is soft.      Tenderness: There is no abdominal tenderness.   Musculoskeletal:      Cervical back: Neck supple.      Right ankle: No swelling, deformity or ecchymosis.      Left ankle: No swelling, deformity or ecchymosis.   Lymphadenopathy:      Head:      Right side of head: No submandibular adenopathy.      Left side of head: No submandibular adenopathy.      Cervical: No cervical adenopathy.   Skin:     General: Skin is warm and dry.      Findings: No rash.   Neurological:      General: No focal deficit present.      Mental Status: She is alert and oriented to person, place, and time. She is not disoriented.      Cranial Nerves: No cranial nerve deficit.   Psychiatric:         Attention and Perception: Attention normal.         Mood and Affect: Mood and affect normal.         Speech: Speech normal.         Behavior: Behavior normal.         Thought Content: Thought content normal.         Judgment: Judgment normal.       Assessment:     1. Congenital heart disease in adult    2. Ventricular septal defect    3. Pacemaker    4. Atrioventricular block, complete    5. Ventricular tachycardia    6.  Precordial pain    7. Acquired hypothyroidism    8. Down syndrome        Plan:     1. Congenital Heart Disease   1986: VSD Repair with patch and Ligation of Patent Truncus.   3/2/2021: Echo: Normal left ventricular size and systolic function. EF 65%. No VSD. Mildly dilated RA. Moderate TR. Pacemaker.   Appears to be doing well.     2. Pacemaker   11/17/2016: Medtronic DDDR pacemaker.   12/28/2020: Programmed & Fine: 70% A paced. 100% V paced. 5 episodes of NSVT - longest 9 seconds at 170 bpm. Rate responsiveness was increased. Estimated JAYLIN 2025.   4/8/2021: Programmed & Fine: 93% A paced. 100% V paved. No VT. Rate responsiveness was increased. Estimated JAYLIN 2026.   7/8/2021: Programmed & Fine: 90% A paced. 100% V paced. Two NSVT episodes: 1 sec and 2 sec at rate 165 bpm. Estimated JAYLIN 2026.    3/10/2022: Programmed & Fine: 71% A paced. 100% V paced. One NSVT episode of 15 beats. Estimated JAYLIN 2026.   9/26/2022: Programmed & Fine: 72% A paced. 100% V paced. One 10 beat run of VT. Estimated JAYLIN 2026.   3/2023: Plan next interrogation. Carelink is on.    3. Atrioventricular Block, Complete   11/17/2016: Received pacemaker.    4. Ventricular Tachycardia   12/28/2020: Programmed & Fine: 5 episodes of NSVT - longest 9 seconds at 170 bpm.   The episode of VT occurred when she was on metoprolol.   12/28/2020: Metoprolol 25 mg Q24 was resumed.   On metoprolol 25 mg Q24.    5. Chest Pain   4/12/2021: EJ: ER: Atypical chest pain.   8/20/2022: EJ: ER: Atypical chest pain. CTA was negative.   Reassurance for now.    6. Orthostatic Hypotension   Mentioned in chart.   Used to be given midodrine.      7. Hypothyroidism   2018: Diagnosed.   On levothyroxine 50 mcg Q24.   Dr. Carlin Badillo.    8. Obstructive Sleep Apnea   5/2021: Diagnosed with obstructive sleep apnea. Using CPAP.    9. Down's Syndrome    10. Primary Care   Dr. Domi Porter.     F/u 6 months.    Madison Luciano M.D.

## 2023-02-09 ENCOUNTER — CLINICAL SUPPORT (OUTPATIENT)
Dept: CARDIOLOGY | Facility: CLINIC | Age: 38
End: 2023-02-09
Payer: MEDICARE

## 2023-02-09 DIAGNOSIS — I44.2 ATRIOVENTRICULAR BLOCK, COMPLETE: ICD-10-CM

## 2023-02-09 DIAGNOSIS — Z95.0 PACEMAKER: Primary | ICD-10-CM

## 2023-02-09 PROCEDURE — 99214 OFFICE O/P EST MOD 30 MIN: CPT | Mod: S$PBB,25,, | Performed by: INTERNAL MEDICINE

## 2023-02-09 PROCEDURE — 93294 PR INTERROG EVAL, REMOTE, UP TO 90 DAYS,PACEMAKER: ICD-10-PCS | Mod: ,,, | Performed by: INTERNAL MEDICINE

## 2023-02-09 PROCEDURE — 99214 PR OFFICE/OUTPT VISIT, EST, LEVL IV, 30-39 MIN: ICD-10-PCS | Mod: S$PBB,25,, | Performed by: INTERNAL MEDICINE

## 2023-02-09 PROCEDURE — 93294 REM INTERROG EVL PM/LDLS PM: CPT | Mod: ,,, | Performed by: INTERNAL MEDICINE

## 2023-03-27 ENCOUNTER — OFFICE VISIT (OUTPATIENT)
Dept: CARDIOLOGY | Facility: CLINIC | Age: 38
End: 2023-03-27
Attending: INTERNAL MEDICINE
Payer: MEDICARE

## 2023-03-27 VITALS
HEART RATE: 71 BPM | BODY MASS INDEX: 24.88 KG/M2 | WEIGHT: 123.44 LBS | OXYGEN SATURATION: 96 % | DIASTOLIC BLOOD PRESSURE: 50 MMHG | HEIGHT: 59 IN | SYSTOLIC BLOOD PRESSURE: 93 MMHG

## 2023-03-27 DIAGNOSIS — I47.20 VT (VENTRICULAR TACHYCARDIA): ICD-10-CM

## 2023-03-27 DIAGNOSIS — I47.20 VENTRICULAR TACHYCARDIA: ICD-10-CM

## 2023-03-27 DIAGNOSIS — R07.2 PRECORDIAL PAIN: ICD-10-CM

## 2023-03-27 DIAGNOSIS — I44.2 ATRIOVENTRICULAR BLOCK, COMPLETE: ICD-10-CM

## 2023-03-27 DIAGNOSIS — Q24.9 CONGENITAL HEART DISEASE IN ADULT: ICD-10-CM

## 2023-03-27 DIAGNOSIS — Z95.0 PACEMAKER: ICD-10-CM

## 2023-03-27 DIAGNOSIS — G47.33 OBSTRUCTIVE SLEEP APNEA: ICD-10-CM

## 2023-03-27 DIAGNOSIS — Q21.0 VENTRICULAR SEPTAL DEFECT: ICD-10-CM

## 2023-03-27 DIAGNOSIS — E03.9 ACQUIRED HYPOTHYROIDISM: ICD-10-CM

## 2023-03-27 PROCEDURE — 99213 OFFICE O/P EST LOW 20 MIN: CPT | Mod: PBBFAC | Performed by: INTERNAL MEDICINE

## 2023-03-27 PROCEDURE — 99214 PR OFFICE/OUTPT VISIT, EST, LEVL IV, 30-39 MIN: ICD-10-PCS | Mod: S$PBB,,, | Performed by: INTERNAL MEDICINE

## 2023-03-27 PROCEDURE — 93280 PR PROGRAM EVAL (IN PERSON) IMPLANT DEVICE,PACEMAKER,2 LEAD: ICD-10-PCS | Mod: 26,S$PBB,, | Performed by: INTERNAL MEDICINE

## 2023-03-27 PROCEDURE — 99214 OFFICE O/P EST MOD 30 MIN: CPT | Mod: S$PBB,,, | Performed by: INTERNAL MEDICINE

## 2023-03-27 PROCEDURE — 93280 PM DEVICE PROGR EVAL DUAL: CPT | Mod: 26,S$PBB,, | Performed by: INTERNAL MEDICINE

## 2023-03-27 PROCEDURE — 93280 PM DEVICE PROGR EVAL DUAL: CPT | Mod: PBBFAC | Performed by: INTERNAL MEDICINE

## 2023-03-27 PROCEDURE — 99999 PR PBB SHADOW E&M-EST. PATIENT-LVL III: CPT | Mod: PBBFAC,,, | Performed by: INTERNAL MEDICINE

## 2023-03-27 PROCEDURE — 99999 PR PBB SHADOW E&M-EST. PATIENT-LVL III: ICD-10-PCS | Mod: PBBFAC,,, | Performed by: INTERNAL MEDICINE

## 2023-03-27 RX ORDER — METOPROLOL SUCCINATE 25 MG/1
25 TABLET, EXTENDED RELEASE ORAL DAILY
Qty: 90 TABLET | Refills: 3 | Status: SHIPPED | OUTPATIENT
Start: 2023-03-27 | End: 2023-09-14 | Stop reason: SDUPTHER

## 2023-03-27 RX ORDER — ASPIRIN 81 MG/1
81 TABLET ORAL DAILY
Qty: 90 TABLET | Refills: 3 | Status: SHIPPED | OUTPATIENT
Start: 2023-03-27 | End: 2023-09-14 | Stop reason: SDUPTHER

## 2023-03-27 NOTE — PROGRESS NOTES
"Subjective:     Kan Matamoros is a 37 y.o. female with Down's syndrome. In 1986 she had a ventricular septal defect repaired with a Dacron patch and a patent ductus ligated. She developed complete heart block and she received a Medtronic pacemaker on 11/17/2016. She had issues with orthstatic hypotension and used to be on midodrine. Ventricular tachycardia appears to have been seen on one interrogation of her pacemaker. On 12/28/2020 her pacemaker was interrogated. There had been five runs of nonsustained ventricular tachycardia with the longest being a 9 beat run. On 4/12/2021 she felt anterior chest wall pain that appeared to have been over scar of the sternotomy. She went to the emergency room at [facility]. A chest X ray was negative. She left before being seen to come and see me. She had no further pain. She had no pain walking over here from the garage. In 5/2021 she was diagnosed with obstructive sleep apnea and began using continuous positive airway pressure therapy. There were some issues about getting a mask that fit. She is on continuous positive airway pressure therapy. On 8/20/2022 she was seen in the emergency room at [facility] for atypical chest pain. She had a computerized tomography angiographic study of her chest that was "negative". She was released and has had no further chest pain. She had been coughing for a few days before the episode of chest pain. No exertional chest pain or clear exertional shortness of breath. No palpitations or weak spells. Some exertional fatigue. Feeling well overall.        Chest Pain   Associated symptoms include malaise/fatigue. Pertinent negatives include no abdominal pain, back pain, claudication, cough, diaphoresis, dizziness, exertional chest pressure, fever, headaches, hemoptysis, irregular heartbeat, leg pain, lower extremity edema, nausea, near-syncope, numbness, orthopnea, palpitations, PND, shortness of breath, sputum " production, syncope, vomiting or weakness.   Pertinent negatives for past medical history include no muscle weakness.   Heart Problem  This is a chronic problem. The current episode started more than 1 year ago. Associated symptoms include chest pain. Pertinent negatives include no abdominal pain, anorexia, arthralgias, change in bowel habit, chills, congestion, coughing, diaphoresis, fatigue, fever, headaches, joint swelling, myalgias, nausea, neck pain, numbness, rash, sore throat, swollen glands, urinary symptoms, vertigo, visual change, vomiting or weakness.     Review of Systems   Constitutional: Positive for malaise/fatigue. Negative for chills, diaphoresis, fatigue and fever.   HENT:  Negative for congestion, nosebleeds and sore throat.    Eyes:  Negative for double vision, vision loss in left eye and vision loss in right eye.   Cardiovascular:  Positive for chest pain. Negative for claudication, dyspnea on exertion, irregular heartbeat, leg swelling, near-syncope, orthopnea, palpitations, paroxysmal nocturnal dyspnea and syncope.   Respiratory:  Negative for cough, hemoptysis, shortness of breath, sputum production and wheezing.    Endocrine: Negative for cold intolerance and heat intolerance.   Hematologic/Lymphatic: Negative for bleeding problem. Does not bruise/bleed easily.   Skin:  Negative for color change and rash.   Musculoskeletal:  Negative for arthralgias, back pain, falls, joint swelling, muscle weakness, myalgias and neck pain.   Gastrointestinal:  Negative for abdominal pain, anorexia, change in bowel habit, heartburn, hematemesis, hematochezia, hemorrhoids, jaundice, melena, nausea and vomiting.   Genitourinary:  Negative for dysuria and hematuria.   Neurological:  Negative for dizziness, focal weakness, headaches, light-headedness, loss of balance, numbness, vertigo and weakness.   Psychiatric/Behavioral:  Negative for altered mental status, depression and memory loss. The patient is not  "nervous/anxious.    Allergic/Immunologic: Negative for hives and persistent infections.       Current Outpatient Medications on File Prior to Visit   Medication Sig Dispense Refill    ascorbic acid, vitamin C, (VITAMIN C) 500 MG tablet Take 500 mg by mouth once daily.      aspirin (ECOTRIN) 81 MG EC tablet Take 1 tablet (81 mg total) by mouth once daily. 90 tablet 3    b complex vitamins capsule Take 1 capsule by mouth once daily.      cholecalciferol, vitamin D3, (VITAMIN D3) 25 mcg (1,000 unit) capsule Take 1,000 Units by mouth once daily.      metoprolol succinate (TOPROL-XL) 25 MG 24 hr tablet TAKE 1 TABLET (25 MG TOTAL) BY MOUTH ONCE DAILY. 90 tablet 3    midodrine (PROAMATINE) 5 MG Tab Take 5 mg by mouth 2 (two) times daily with meals.      MULTIVIT,CALC,MINS/IRON/FOLIC (ONE-A-DAY WOMENS FORMULA ORAL) Take by mouth.      multivitamin capsule Take 1 capsule by mouth.      SYNTHROID 75 mcg tablet TAKE 1 TABLET (75 MCG TOTAL) BY MOUTH BEFORE BREAKFAST. 90 tablet 3    terbinafine HCL (LAMISIL) 250 mg tablet        No current facility-administered medications on file prior to visit.       BP (!) 93/50 (BP Location: Right arm, Patient Position: Sitting, BP Method: Large (Manual))   Pulse 71   Ht 4' 11" (1.499 m)   Wt 56 kg (123 lb 7.3 oz)   SpO2 96%   BMI 24.94 kg/m²       Objective:     Physical Exam  Constitutional:       General: She is not in acute distress.     Appearance: Normal appearance. She is well-developed. She is not toxic-appearing or diaphoretic.   HENT:      Head: Normocephalic and atraumatic.      Nose: Nose normal.   Eyes:      General:         Right eye: No discharge.         Left eye: No discharge.      Conjunctiva/sclera:      Right eye: Right conjunctiva is not injected.      Left eye: Left conjunctiva is not injected.      Pupils: Pupils are equal.      Right eye: Pupil is round.      Left eye: Pupil is round.   Neck:      Thyroid: No thyromegaly.      Vascular: No carotid bruit or JVD. "   Cardiovascular:      Rate and Rhythm: Normal rate and regular rhythm. No extrasystoles are present.     Chest Wall: PMI is not displaced.      Pulses:           Radial pulses are 2+ on the right side and 2+ on the left side.        Femoral pulses are 2+ on the right side and 2+ on the left side.       Dorsalis pedis pulses are 2+ on the right side and 2+ on the left side.        Posterior tibial pulses are 2+ on the right side and 2+ on the left side.      Heart sounds: S1 normal and S2 normal. Murmur heard.   Midsystolic murmur is present with a grade of 2/6 at the upper right sternal border.     No gallop.   Pulmonary:      Effort: Pulmonary effort is normal.      Breath sounds: Normal breath sounds.   Chest:      Comments: Sore over chest wall.  Pacemaker right upper chest.  Abdominal:      Palpations: Abdomen is soft.      Tenderness: There is no abdominal tenderness.   Musculoskeletal:      Cervical back: Neck supple.      Right ankle: No swelling, deformity or ecchymosis.      Left ankle: No swelling, deformity or ecchymosis.   Lymphadenopathy:      Head:      Right side of head: No submandibular adenopathy.      Left side of head: No submandibular adenopathy.      Cervical: No cervical adenopathy.   Skin:     General: Skin is warm and dry.      Findings: No rash.   Neurological:      General: No focal deficit present.      Mental Status: She is alert and oriented to person, place, and time. She is not disoriented.      Cranial Nerves: No cranial nerve deficit.   Psychiatric:         Attention and Perception: Attention normal.         Mood and Affect: Mood and affect normal.         Speech: Speech normal.         Behavior: Behavior normal.         Thought Content: Thought content normal.         Judgment: Judgment normal.       Assessment:     1. Congenital heart disease in adult    2. Ventricular septal defect    3. Pacemaker    4. Atrioventricular block, complete    5. Ventricular tachycardia    6.  Precordial pain    7. Acquired hypothyroidism    8. Obstructive sleep apnea        Plan:     1. Congenital Heart Disease   1986: VSD Repair with patch and Ligation of Patent Truncus.   3/2/2021: Echo: Normal left ventricular size and systolic function. EF 65%. No VSD. Mildly dilated RA. Moderate TR. Pacemaker.   Appears to be doing well.     2. Pacemaker   11/17/2016: Medtronic DDDR pacemaker.   12/28/2020: Programmed & Fine: 70% A paced. 100% V paced. 5 episodes of NSVT - longest 9 seconds at 170 bpm. Rate responsiveness was increased. Estimated JAYLIN 2025.   4/8/2021: Programmed & Fine: 93% A paced. 100% V paved. No VT. Rate responsiveness was increased. Estimated JAYLIN 2026.   7/8/2021: Programmed & Fine: 90% A paced. 100% V paced. Two NSVT episodes: 1 sec and 2 sec at rate 165 bpm. Estimated JAYLIN 2026.    3/10/2022: Programmed & Fine: 71% A paced. 100% V paced. One NSVT episode of 15 beats. Estimated JAYLIN 2026. 9/26/2022: Programmed & Fine: 72% A paced. 100% V paced. One 10 beat run of VT. Estimated JAYLIN 2026.   3/27/2023: Programmed & Fine: 84% A paced. 99% V paced. 9/27/2022: One 8 beat VT. Estimated ER 2026. 9/2023: Plan next interrogation. Carelink is on.    3. Atrioventricular Block, Complete   11/17/2016: Received pacemaker.    4. Ventricular Tachycardia   12/28/2020: Programmed & Fine: 5 episodes of NSVT - longest 9 seconds at 170 bpm.   The episode of VT occurred when she was on metoprolol.   12/28/2020: Metoprolol 25 mg Q24 was resumed.   On metoprolol 25 mg Q24.    5. Chest Pain   4/12/2021: EJ: ER: Atypical chest pain.   8/20/2022: EJ: ER: Atypical chest pain. CTA was negative.   Reassurance for now.    6. Orthostatic Hypotension   Mentioned in chart.   Used to be given midodrine.      7. Hypothyroidism   2018: Diagnosed.   On levothyroxine 50 mcg Q24.   Dr. Carlin Badillo.    8. Obstructive Sleep Apnea   5/2021: Diagnosed with obstructive sleep apnea. Using CPAP.    9. Down's Syndrome    10. Primary  Care   Dr. Domi Porter.     F/u 6 months.    Madison Luciano M.D.

## 2023-05-08 DIAGNOSIS — Z95.0 PACEMAKER: ICD-10-CM

## 2023-05-08 DIAGNOSIS — Q24.9 CONGENITAL HEART DISEASE IN ADULT: ICD-10-CM

## 2023-05-08 DIAGNOSIS — Q21.0 VENTRICULAR SEPTAL DEFECT: ICD-10-CM

## 2023-05-08 RX ORDER — CLINDAMYCIN HYDROCHLORIDE 300 MG/1
600 CAPSULE ORAL ONCE AS NEEDED
Qty: 10 CAPSULE | Refills: 3 | Status: SHIPPED | OUTPATIENT
Start: 2023-05-08 | End: 2023-05-08

## 2023-09-07 DIAGNOSIS — E03.8 SUBCLINICAL HYPOTHYROIDISM: Primary | ICD-10-CM

## 2023-09-07 RX ORDER — LEVOTHYROXINE SODIUM 75 UG/1
75 TABLET ORAL
Qty: 90 TABLET | Refills: 1 | Status: SHIPPED | OUTPATIENT
Start: 2023-09-07 | End: 2023-12-12

## 2023-09-07 RX ORDER — LEVOTHYROXINE SODIUM 75 UG/1
TABLET ORAL
Qty: 90 TABLET | Refills: 3 | OUTPATIENT
Start: 2023-09-07

## 2023-09-07 NOTE — TELEPHONE ENCOUNTER
----- Message from Karime Valdivia MA sent at 9/7/2023  3:24 PM CDT -----    ----- Message -----  From: Rachael Pedersen  Sent: 9/7/2023   2:47 PM CDT  To: [facility] Staff    Type: Appointment Request      Name of Caller: Lay (mother)    When is the first available appointment? 01/22/24     Reason for Visit: Annual/ medication refill     Best Call Back Number: [phone]     Additional Information: patient is out of her medication would like to know if possible there a sooner appt

## 2023-09-14 ENCOUNTER — OFFICE VISIT (OUTPATIENT)
Dept: CARDIOLOGY | Facility: CLINIC | Age: 38
End: 2023-09-14
Attending: INTERNAL MEDICINE
Payer: MEDICARE

## 2023-09-14 VITALS
HEIGHT: 59 IN | OXYGEN SATURATION: 95 % | DIASTOLIC BLOOD PRESSURE: 51 MMHG | WEIGHT: 123.25 LBS | BODY MASS INDEX: 24.85 KG/M2 | HEART RATE: 73 BPM | SYSTOLIC BLOOD PRESSURE: 105 MMHG

## 2023-09-14 DIAGNOSIS — G47.33 OBSTRUCTIVE SLEEP APNEA: ICD-10-CM

## 2023-09-14 DIAGNOSIS — I47.20 VT (VENTRICULAR TACHYCARDIA): ICD-10-CM

## 2023-09-14 DIAGNOSIS — Q90.9 DOWN SYNDROME: ICD-10-CM

## 2023-09-14 DIAGNOSIS — Q24.9 CONGENITAL HEART DISEASE IN ADULT: ICD-10-CM

## 2023-09-14 DIAGNOSIS — Z95.0 PACEMAKER: ICD-10-CM

## 2023-09-14 DIAGNOSIS — I44.2 ATRIOVENTRICULAR BLOCK, COMPLETE: ICD-10-CM

## 2023-09-14 DIAGNOSIS — R07.2 PRECORDIAL PAIN: ICD-10-CM

## 2023-09-14 DIAGNOSIS — I47.20 VENTRICULAR TACHYCARDIA: ICD-10-CM

## 2023-09-14 DIAGNOSIS — E03.9 ACQUIRED HYPOTHYROIDISM: ICD-10-CM

## 2023-09-14 DIAGNOSIS — Q21.0 VENTRICULAR SEPTAL DEFECT: ICD-10-CM

## 2023-09-14 PROCEDURE — 99214 PR OFFICE/OUTPT VISIT, EST, LEVL IV, 30-39 MIN: ICD-10-PCS | Mod: S$PBB,25,, | Performed by: INTERNAL MEDICINE

## 2023-09-14 PROCEDURE — 99999 PR PBB SHADOW E&M-EST. PATIENT-LVL III: CPT | Mod: PBBFAC,,, | Performed by: INTERNAL MEDICINE

## 2023-09-14 PROCEDURE — 99999 PR PBB SHADOW E&M-EST. PATIENT-LVL III: ICD-10-PCS | Mod: PBBFAC,,, | Performed by: INTERNAL MEDICINE

## 2023-09-14 PROCEDURE — 93280 PM DEVICE PROGR EVAL DUAL: CPT | Mod: PBBFAC | Performed by: INTERNAL MEDICINE

## 2023-09-14 PROCEDURE — 93280 PM DEVICE PROGR EVAL DUAL: CPT | Mod: 26,S$PBB,, | Performed by: INTERNAL MEDICINE

## 2023-09-14 PROCEDURE — 93280 PR PROGRAM EVAL (IN PERSON) IMPLANT DEVICE,PACEMAKER,2 LEAD: ICD-10-PCS | Mod: 26,S$PBB,, | Performed by: INTERNAL MEDICINE

## 2023-09-14 PROCEDURE — 99213 OFFICE O/P EST LOW 20 MIN: CPT | Mod: PBBFAC | Performed by: INTERNAL MEDICINE

## 2023-09-14 PROCEDURE — 99214 OFFICE O/P EST MOD 30 MIN: CPT | Mod: S$PBB,25,, | Performed by: INTERNAL MEDICINE

## 2023-09-14 RX ORDER — ASPIRIN 81 MG/1
81 TABLET ORAL DAILY
Qty: 90 TABLET | Refills: 3 | Status: SHIPPED | OUTPATIENT
Start: 2023-09-14

## 2023-09-14 RX ORDER — METOPROLOL SUCCINATE 25 MG/1
25 TABLET, EXTENDED RELEASE ORAL DAILY
Qty: 90 TABLET | Refills: 3 | Status: SHIPPED | OUTPATIENT
Start: 2023-09-14

## 2023-09-14 NOTE — PROGRESS NOTES
"Subjective:     Kan Matamoros is a 38 y.o. female with Down's syndrome. In 1986 she had a ventricular septal defect repaired with a Dacron patch and a patent ductus ligated. She developed complete heart block and she received a Medtronic pacemaker on 11/17/2016. She had issues with orthstatic hypotension and used to be on midodrine. Ventricular tachycardia appears to have been seen on one interrogation of her pacemaker. On 12/28/2020 her pacemaker was interrogated. There had been five runs of nonsustained ventricular tachycardia with the longest being a 9 beat run. On 4/12/2021 she felt anterior chest wall pain that appeared to have been over scar of the sternotomy. She went to the emergency room at [facility]. A chest X ray was negative. She left before being seen to come and see me. She had no further pain. She had no pain walking over here from the garage. In 5/2021 she was diagnosed with obstructive sleep apnea and began using continuous positive airway pressure therapy. There were some issues about getting a mask that fit. She is on continuous positive airway pressure therapy. On 8/20/2022 she was seen in the emergency room at [facility] for atypical chest pain. She had a computerized tomography angiographic study of her chest that was "negative". She was released and has had no further chest pain. She had been coughing for a few days before the episode of chest pain. No exertional chest pain or clear exertional shortness of breath. No palpitations or weak spells. Some exertional fatigue. Feeling well overall.        Chest Pain   Associated symptoms include malaise/fatigue. Pertinent negatives include no abdominal pain, back pain, claudication, cough, diaphoresis, dizziness, exertional chest pressure, fever, headaches, hemoptysis, irregular heartbeat, leg pain, lower extremity edema, nausea, near-syncope, numbness, orthopnea, palpitations, PND, shortness of breath, sputum " production, syncope, vomiting or weakness.   Pertinent negatives for past medical history include no muscle weakness.   Heart Problem  This is a chronic problem. The current episode started more than 1 year ago. Associated symptoms include chest pain. Pertinent negatives include no abdominal pain, anorexia, arthralgias, change in bowel habit, chills, congestion, coughing, diaphoresis, fatigue, fever, headaches, joint swelling, myalgias, nausea, neck pain, numbness, rash, sore throat, swollen glands, urinary symptoms, vertigo, visual change, vomiting or weakness.       Review of Systems   Constitutional: Positive for malaise/fatigue. Negative for chills, diaphoresis, fatigue and fever.   HENT:  Negative for congestion, nosebleeds and sore throat.    Eyes:  Negative for double vision, vision loss in left eye and vision loss in right eye.   Cardiovascular:  Positive for chest pain. Negative for claudication, dyspnea on exertion, irregular heartbeat, leg swelling, near-syncope, orthopnea, palpitations, paroxysmal nocturnal dyspnea and syncope.   Respiratory:  Negative for cough, hemoptysis, shortness of breath, sputum production and wheezing.    Endocrine: Negative for cold intolerance and heat intolerance.   Hematologic/Lymphatic: Negative for bleeding problem. Does not bruise/bleed easily.   Skin:  Negative for color change and rash.   Musculoskeletal:  Negative for arthralgias, back pain, falls, joint swelling, muscle weakness, myalgias and neck pain.   Gastrointestinal:  Negative for abdominal pain, anorexia, change in bowel habit, heartburn, hematemesis, hematochezia, hemorrhoids, jaundice, melena, nausea and vomiting.   Genitourinary:  Negative for dysuria and hematuria.   Neurological:  Negative for dizziness, focal weakness, headaches, light-headedness, loss of balance, numbness, vertigo and weakness.   Psychiatric/Behavioral:  Negative for altered mental status, depression and memory loss. The patient is not  "nervous/anxious.    Allergic/Immunologic: Negative for hives and persistent infections.       Current Outpatient Medications on File Prior to Visit   Medication Sig Dispense Refill    ascorbic acid, vitamin C, (VITAMIN C) 500 MG tablet Take 500 mg by mouth once daily.      aspirin (ECOTRIN) 81 MG EC tablet Take 1 tablet (81 mg total) by mouth once daily. 90 tablet 3    b complex vitamins capsule Take 1 capsule by mouth once daily.      cholecalciferol, vitamin D3, (VITAMIN D3) 25 mcg (1,000 unit) capsule Take 1,000 Units by mouth once daily.      metoprolol succinate (TOPROL-XL) 25 MG 24 hr tablet Take 1 tablet (25 mg total) by mouth once daily. 90 tablet 3    MULTIVIT,CALC,MINS/IRON/FOLIC (ONE-A-DAY WOMENS FORMULA ORAL) Take by mouth.      multivitamin capsule Take 1 capsule by mouth.      SYNTHROID 75 mcg tablet Take 1 tablet (75 mcg total) by mouth before breakfast. 90 tablet 1    terbinafine HCL (LAMISIL) 250 mg tablet        No current facility-administered medications on file prior to visit.       BP (!) 105/51 (BP Location: Right arm, Patient Position: Sitting, BP Method: Medium (Manual))   Pulse 73   Ht 4' 11" (1.499 m)   Wt 55.9 kg (123 lb 3.8 oz)   SpO2 95%   BMI 24.89 kg/m²     Objective:     Physical Exam  Constitutional:       General: She is not in acute distress.     Appearance: Normal appearance. She is well-developed. She is not toxic-appearing or diaphoretic.   HENT:      Head: Normocephalic and atraumatic.      Nose: Nose normal.   Eyes:      General:         Right eye: No discharge.         Left eye: No discharge.      Conjunctiva/sclera:      Right eye: Right conjunctiva is not injected.      Left eye: Left conjunctiva is not injected.      Pupils: Pupils are equal.      Right eye: Pupil is round.      Left eye: Pupil is round.   Neck:      Thyroid: No thyromegaly.      Vascular: No carotid bruit or JVD.   Cardiovascular:      Rate and Rhythm: Normal rate and regular rhythm. No " extrasystoles are present.     Chest Wall: PMI is not displaced.      Pulses:           Radial pulses are 2+ on the right side and 2+ on the left side.        Femoral pulses are 2+ on the right side and 2+ on the left side.       Dorsalis pedis pulses are 2+ on the right side and 2+ on the left side.        Posterior tibial pulses are 2+ on the right side and 2+ on the left side.      Heart sounds: S1 normal and S2 normal. Murmur heard.      Midsystolic murmur is present with a grade of 2/6 at the upper right sternal border.      No gallop.   Pulmonary:      Effort: Pulmonary effort is normal.      Breath sounds: Normal breath sounds.   Chest:      Comments: Sore over chest wall.  Pacemaker right upper chest.  Abdominal:      Palpations: Abdomen is soft.      Tenderness: There is no abdominal tenderness.   Musculoskeletal:      Cervical back: Neck supple.      Right ankle: No swelling, deformity or ecchymosis.      Left ankle: No swelling, deformity or ecchymosis.   Lymphadenopathy:      Head:      Right side of head: No submandibular adenopathy.      Left side of head: No submandibular adenopathy.      Cervical: No cervical adenopathy.   Skin:     General: Skin is warm and dry.      Findings: No rash.   Neurological:      General: No focal deficit present.      Mental Status: She is alert and oriented to person, place, and time. She is not disoriented.      Cranial Nerves: No cranial nerve deficit.   Psychiatric:         Attention and Perception: Attention normal.         Mood and Affect: Mood and affect normal.         Speech: Speech normal.         Behavior: Behavior normal.         Thought Content: Thought content normal.         Judgment: Judgment normal.         Assessment:     1. Congenital heart disease in adult    2. Ventricular septal defect    3. Pacemaker    4. Atrioventricular block, complete    5. Ventricular tachycardia    6. Precordial pain    7. Acquired hypothyroidism    8. Obstructive sleep apnea     9. Down syndrome        Plan:     1. Congenital Heart Disease   1986: VSD Repair with patch and Ligation of Patent Truncus.   3/2/2021: Echo: Normal left ventricular size and systolic function. EF 65%. No VSD. Mildly dilated RA. Moderate TR. Pacemaker.   Appears to be doing well.     2. Pacemaker   11/17/2016: Medtronic DDDR pacemaker.   12/28/2020: Programmed & Fine: 70% A paced. 100% V paced. 5 episodes of NSVT - longest 9 seconds at 170 bpm. Rate responsiveness was increased. Estimated JAYLIN 2025.   4/8/2021: Programmed & Fine: 93% A paced. 100% V paved. No VT. Rate responsiveness was increased. Estimated JAYLIN 2026.   7/8/2021: Programmed & Fine: 90% A paced. 100% V paced. Two NSVT episodes: 1 sec and 2 sec at rate 165 bpm. Estimated JAYLIN 2026.    3/10/2022: Programmed & Fine: 71% A paced. 100% V paced. One NSVT episode of 15 beats. Estimated JAYLIN 2026. 9/26/2022: Programmed & Fine: 72% A paced. 100% V paced. One 10 beat run of VT. Estimated JAYLIN 2026.   3/27/2023: Programmed & Fine: 84% A paced. 99% V paced. 9/27/2022: One 8 beat VT. Estimated ER 2026. 9/14/2023: Programmed & Fine: 81% A paced. 100% V paced. No VT. Estimated JAYLIN 2026.    3/2024: Plan next interrogation. [facility]link is on.    3. Atrioventricular Block, Complete   11/17/2016: Received pacemaker.    4. Ventricular Tachycardia   12/28/2020: Programmed & Fine: 5 episodes of NSVT - longest 9 seconds at 170 bpm.   The episode of VT occurred when she was on metoprolol.   12/28/2020: Metoprolol 25 mg Q24 was resumed.   On metoprolol 25 mg Q24.   No recurrence.    5. Chest Pain   4/12/2021: EJ: ER: Atypical chest pain.   8/20/2022: EJ: ER: Atypical chest pain. CTA was negative.   Reassurance.    6. Orthostatic Hypotension   Mentioned in chart.   Used to be given midodrine.      7. Hypothyroidism   2018: Diagnosed.   On levothyroxine 50 mcg Q24.   Dr. Carlin Badillo.    8. Obstructive Sleep Apnea   5/2021: Diagnosed with obstructive sleep apnea. Using  CPAP.    9. Down's Syndrome    10. Primary Care   Dr. Domi Porter.     F/u 6 months.    Madison Luciano M.D.

## 2023-12-12 DIAGNOSIS — E03.8 SUBCLINICAL HYPOTHYROIDISM: ICD-10-CM

## 2023-12-12 RX ORDER — LEVOTHYROXINE SODIUM 75 UG/1
75 TABLET ORAL
Qty: 90 TABLET | Refills: 1 | Status: SHIPPED | OUTPATIENT
Start: 2023-12-12

## 2024-02-28 ENCOUNTER — OFFICE VISIT (OUTPATIENT)
Dept: ENDOCRINOLOGY | Facility: CLINIC | Age: 39
End: 2024-02-28
Payer: MEDICARE

## 2024-02-28 ENCOUNTER — LAB VISIT (OUTPATIENT)
Dept: LAB | Facility: HOSPITAL | Age: 39
End: 2024-02-28
Payer: MEDICARE

## 2024-02-28 VITALS
DIASTOLIC BLOOD PRESSURE: 58 MMHG | BODY MASS INDEX: 23.45 KG/M2 | WEIGHT: 116.31 LBS | HEIGHT: 59 IN | SYSTOLIC BLOOD PRESSURE: 109 MMHG | OXYGEN SATURATION: 97 % | HEART RATE: 86 BPM

## 2024-02-28 DIAGNOSIS — Q90.9 DOWN SYNDROME: ICD-10-CM

## 2024-02-28 DIAGNOSIS — E03.9 ACQUIRED HYPOTHYROIDISM: Primary | ICD-10-CM

## 2024-02-28 DIAGNOSIS — E55.9 VITAMIN D DEFICIENCY: ICD-10-CM

## 2024-02-28 DIAGNOSIS — E03.9 ACQUIRED HYPOTHYROIDISM: ICD-10-CM

## 2024-02-28 LAB
T4 FREE SERPL-MCNC: 1 NG/DL (ref 0.71–1.51)
TSH SERPL DL<=0.005 MIU/L-ACNC: 0.33 UIU/ML (ref 0.4–4)

## 2024-02-28 PROCEDURE — 99214 OFFICE O/P EST MOD 30 MIN: CPT | Mod: S$PBB,GC,, | Performed by: INTERNAL MEDICINE

## 2024-02-28 PROCEDURE — 36415 COLL VENOUS BLD VENIPUNCTURE: CPT | Performed by: STUDENT IN AN ORGANIZED HEALTH CARE EDUCATION/TRAINING PROGRAM

## 2024-02-28 PROCEDURE — 99213 OFFICE O/P EST LOW 20 MIN: CPT | Mod: PBBFAC

## 2024-02-28 PROCEDURE — G2211 COMPLEX E/M VISIT ADD ON: HCPCS | Mod: S$PBB,,, | Performed by: INTERNAL MEDICINE

## 2024-02-28 PROCEDURE — 99999 PR PBB SHADOW E&M-EST. PATIENT-LVL III: CPT | Mod: PBBFAC,,,

## 2024-02-28 PROCEDURE — 84439 ASSAY OF FREE THYROXINE: CPT | Performed by: STUDENT IN AN ORGANIZED HEALTH CARE EDUCATION/TRAINING PROGRAM

## 2024-02-28 PROCEDURE — 84443 ASSAY THYROID STIM HORMONE: CPT | Performed by: STUDENT IN AN ORGANIZED HEALTH CARE EDUCATION/TRAINING PROGRAM

## 2024-02-28 NOTE — PROGRESS NOTES
I have seen the patient, reviewed the fellow's history and physical, assessment, plan, and progress note. I have personally interviewed and examined the patient at bedside and agree with the findings.     Check TFTs today.  Kan seems to be doing relatively well.    Carlin Badillo MD  Endocrinology Staff

## 2024-02-28 NOTE — PROGRESS NOTES
Subjective:      Chief Complaint: Follow-up for hypothyroidism    HPI: Kan Matamoros is a 38 y.o. female who is here for follow-up evaluation for hypothyroidism.    The patient's last visit with Dr. Badillo was on 6/10/2022.    History:  Kan has Down syndrome and has a history of large VSD and a PDA with heart failure, which was repaired when she was 1 years old. She has a pacemaker and is on midodrine for orthostatic hypotension. She was referred in March, 2018 from Dr. Westbrook due to abnormal TFTs. She was having episodes of fatigue, weakness, lightheadedness and near syncope, for which she underwent tilt-table testing. We started her on Synthroid 50 mcg daily in 3/2018. Her mom, Chad also has Hashimoto's disease and is on Synthroid.    She is living with her mom right now but has a lot of activities outside the house and keeps an active lifestyle. Using CPAP consistently everyday.    She has lost about 8 lb in the last few months with dietary changes.    Current medications:  Brand name Synthroid 75 mcg once daily  Takes it with other pills in the AM    Taking vitamin D 1000 IUs once daily - 25-vitamin-D level in September, 2018 was 39.    03/02/23   TSH 3.83    Lab Results   Component Value Date    TSH <0.10 (L) 10/07/2021    TSH 5.931 (H) 06/17/2021    TSH 2.166 12/12/2020    FREET4 0.86 06/17/2021    FREET4 1.00 08/03/2018    FREET4 0.85 04/23/2018       Last Vitamin D was 45 in 10/2021    Reviewed past medical, family, social history and updated as appropriate.      Objective:     Vitals:    02/28/24 0912   BP: (!) 109/58   Pulse: 86         Physical Exam  Vitals and nursing note reviewed.   Constitutional:       General: She is not in acute distress.     Appearance: She is well-developed.      Comments: Short stature with classic Down syndrome features   HENT:      Head: Normocephalic and atraumatic.   Eyes:      General:         Right eye: No discharge.         Left eye: No discharge.       Conjunctiva/sclera: Conjunctivae normal.   Neck:      Thyroid: No thyromegaly.      Trachea: No tracheal deviation.   Cardiovascular:      Rate and Rhythm: Normal rate.   Pulmonary:      Effort: Pulmonary effort is normal. No respiratory distress.   Musculoskeletal:      Comments: No digital clubbing or extremity cyanosis   Skin:     General: Skin is warm and dry.      Comments: Mottled skin   Neurological:      Mental Status: She is alert and oriented to person, place, and time. Mental status is at baseline.      Coordination: Coordination normal.   Psychiatric:         Mood and Affect: Mood normal.         Behavior: Behavior normal.         Wt Readings from Last 10 Encounters:   02/28/24 0912 52.7 kg (116 lb 4.7 oz)   09/14/23 1601 55.9 kg (123 lb 3.8 oz)   03/27/23 1644 56 kg (123 lb 7.3 oz)   09/26/22 1608 56 kg (123 lb 7.3 oz)   08/25/22 1219 54.6 kg (120 lb 7.7 oz)   06/10/22 0847 55.3 kg (122 lb 0.4 oz)   03/10/22 1538 53.8 kg (118 lb 9.7 oz)   10/07/21 1606 54.2 kg (119 lb 6.1 oz)   07/08/21 1537 54.3 kg (119 lb 11.4 oz)   06/18/21 1444 54.6 kg (120 lb 5.9 oz)     Lab Results   Component Value Date     06/26/2019    K 4.2 06/26/2019     06/26/2019    CO2 29 06/26/2019    GLU 95 06/26/2019    BUN 15 06/26/2019    CREATININE 0.9 06/26/2019    CALCIUM 9.5 06/26/2019    PROT 7.4 02/12/2018    ALBUMIN 4.0 02/12/2018    BILITOT 0.9 02/12/2018    ALKPHOS 62 02/12/2018    AST 30 02/12/2018    ALT 22 02/12/2018    ANIONGAP 8 06/26/2019    ESTGFRAFRICA >60.0 06/26/2019    EGFRNONAA >60.0 06/26/2019    TSH <0.10 (L) 10/07/2021        Assessment/Plan:     Acquired hypothyroidism  Currently on Synthroid 75 mcg daily. Check TFTs today.    She is taking it with other pills, counseled to take it separately.    Counseled to take it appropriately first thing in the AM, waiting 30 minutes prior to eating/drinking.          Vitamin D deficiency  Continue vitamin D supplementation    Down syndrome  Endocrinopathies  associated with Down syndrome include hypothyroidism and diabetes (positively correlated with type 1 diabetes). Screening A1c was normal in 2018 and 2020. Serum glucose normal on labs in 2019.     RTC 12 months      Sayra Shea MD  [facility]

## 2024-02-28 NOTE — ASSESSMENT & PLAN NOTE
Currently on Synthroid 75 mcg daily. Check TFTs today.    She is taking it with other pills, counseled to take it separately.    Counseled to take it appropriately first thing in the AM, waiting 30 minutes prior to eating/drinking.

## 2024-04-29 ENCOUNTER — OFFICE VISIT (OUTPATIENT)
Dept: CARDIOLOGY | Facility: CLINIC | Age: 39
End: 2024-04-29
Attending: INTERNAL MEDICINE
Payer: MEDICARE

## 2024-04-29 VITALS
SYSTOLIC BLOOD PRESSURE: 90 MMHG | HEART RATE: 72 BPM | BODY MASS INDEX: 22.89 KG/M2 | DIASTOLIC BLOOD PRESSURE: 50 MMHG | OXYGEN SATURATION: 98 % | HEIGHT: 59 IN | WEIGHT: 113.56 LBS

## 2024-04-29 DIAGNOSIS — Q90.9 DOWN SYNDROME: ICD-10-CM

## 2024-04-29 DIAGNOSIS — I47.20 VENTRICULAR TACHYCARDIA: ICD-10-CM

## 2024-04-29 DIAGNOSIS — Q24.9 CONGENITAL HEART DISEASE IN ADULT: ICD-10-CM

## 2024-04-29 DIAGNOSIS — R07.2 PRECORDIAL PAIN: ICD-10-CM

## 2024-04-29 DIAGNOSIS — E03.9 ACQUIRED HYPOTHYROIDISM: ICD-10-CM

## 2024-04-29 DIAGNOSIS — I44.2 ATRIOVENTRICULAR BLOCK, COMPLETE: ICD-10-CM

## 2024-04-29 DIAGNOSIS — Z95.0 PACEMAKER: ICD-10-CM

## 2024-04-29 DIAGNOSIS — Q21.0 VENTRICULAR SEPTAL DEFECT: ICD-10-CM

## 2024-04-29 DIAGNOSIS — G47.33 OBSTRUCTIVE SLEEP APNEA: ICD-10-CM

## 2024-04-29 PROBLEM — E05.90 HYPERTHYROIDISM: Status: ACTIVE | Noted: 2018-03-06

## 2024-04-29 PROCEDURE — 99214 OFFICE O/P EST MOD 30 MIN: CPT | Mod: S$PBB,25,, | Performed by: INTERNAL MEDICINE

## 2024-04-29 PROCEDURE — 93280 PM DEVICE PROGR EVAL DUAL: CPT | Mod: PBBFAC | Performed by: INTERNAL MEDICINE

## 2024-04-29 PROCEDURE — 99213 OFFICE O/P EST LOW 20 MIN: CPT | Mod: PBBFAC | Performed by: INTERNAL MEDICINE

## 2024-04-29 PROCEDURE — 93280 PM DEVICE PROGR EVAL DUAL: CPT | Mod: 26,S$PBB,, | Performed by: INTERNAL MEDICINE

## 2024-04-29 PROCEDURE — 99999 PR PBB SHADOW E&M-EST. PATIENT-LVL III: CPT | Mod: PBBFAC,,, | Performed by: INTERNAL MEDICINE

## 2024-04-29 RX ORDER — DOXYCYCLINE HYCLATE 100 MG
100 TABLET ORAL ONCE AS NEEDED
Qty: 3 TABLET | Refills: 11 | Status: SHIPPED | OUTPATIENT
Start: 2024-04-29

## 2024-04-29 RX ORDER — METOPROLOL SUCCINATE 25 MG/1
25 TABLET, EXTENDED RELEASE ORAL DAILY
Qty: 90 TABLET | Refills: 3 | Status: SHIPPED | OUTPATIENT
Start: 2024-04-29

## 2024-04-29 NOTE — PROGRESS NOTES
"Subjective:     Kan Matamoros is a 38 y.o. female with Down's syndrome. In 1986 she had a ventricular septal defect repaired with a Dacron patch and a patent ductus ligated. She developed complete heart block and she received a Medtronic pacemaker on 11/17/2016. She had issues with orthstatic hypotension and used to be on midodrine. Ventricular tachycardia appears to have been seen on one interrogation of her pacemaker. On 12/28/2020 her pacemaker was interrogated. There had been five runs of nonsustained ventricular tachycardia with the longest being a 9 beat run. On 4/12/2021 she felt anterior chest wall pain that appeared to have been over scar of the sternotomy. She went to the emergency room at [facility]. A chest X ray was negative. She left before being seen to come and see me. She had no further pain. She had no pain walking over here from the garage. In 5/2021 she was diagnosed with obstructive sleep apnea and began using continuous positive airway pressure therapy. There were some issues about getting a mask that fit. She is on continuous positive airway pressure therapy. On 8/20/2022 she was seen in the emergency room at [facility] for atypical chest pain. She had a computerized tomography angiographic study of her chest that was "negative". She was released and has had no further chest pain. She had been coughing for a few days before the episode of chest pain. No exertional chest pain or clear exertional shortness of breath. No palpitations or weak spells. Some exertional fatigue. Feeling well overall.      Chest Pain   Associated symptoms include malaise/fatigue and palpitations. Pertinent negatives include no abdominal pain, back pain, claudication, cough, diaphoresis, dizziness, exertional chest pressure, fever, headaches, hemoptysis, irregular heartbeat, leg pain, lower extremity edema, nausea, near-syncope, numbness, orthopnea, PND, shortness of breath, " sputum production, syncope, vomiting or weakness.   Pertinent negatives for past medical history include no muscle weakness.   Heart Problem  This is a chronic problem. The current episode started more than 1 year ago. Associated symptoms include chest pain. Pertinent negatives include no abdominal pain, anorexia, arthralgias, change in bowel habit, chills, congestion, coughing, diaphoresis, fatigue, fever, headaches, joint swelling, myalgias, nausea, neck pain, numbness, rash, sore throat, swollen glands, urinary symptoms, vertigo, visual change, vomiting or weakness.   Palpitations   Associated symptoms include chest pain and malaise/fatigue. Pertinent negatives include no anxiety, coughing, diaphoresis, dizziness, fever, irregular heartbeat, nausea, near-syncope, numbness, shortness of breath, syncope, vomiting or weakness.       Review of Systems   Constitutional: Positive for malaise/fatigue. Negative for chills, diaphoresis, fatigue and fever.   HENT:  Negative for congestion, nosebleeds and sore throat.    Eyes:  Negative for double vision, vision loss in left eye and vision loss in right eye.   Cardiovascular:  Positive for chest pain and palpitations. Negative for claudication, dyspnea on exertion, irregular heartbeat, leg swelling, near-syncope, orthopnea, paroxysmal nocturnal dyspnea and syncope.   Respiratory:  Negative for cough, hemoptysis, shortness of breath, sputum production and wheezing.    Endocrine: Negative for cold intolerance and heat intolerance.   Hematologic/Lymphatic: Negative for bleeding problem. Does not bruise/bleed easily.   Skin:  Negative for color change and rash.   Musculoskeletal:  Negative for arthralgias, back pain, falls, joint swelling, muscle weakness, myalgias and neck pain.   Gastrointestinal:  Negative for abdominal pain, anorexia, change in bowel habit, heartburn, hematemesis, hematochezia, hemorrhoids, jaundice, melena, nausea and vomiting.   Genitourinary:  Negative  "for dysuria and hematuria.   Neurological:  Negative for dizziness, focal weakness, headaches, light-headedness, loss of balance, numbness, vertigo and weakness.   Psychiatric/Behavioral:  Negative for altered mental status, depression and memory loss. The patient is not nervous/anxious.    Allergic/Immunologic: Negative for hives and persistent infections.       Current Outpatient Medications on File Prior to Visit   Medication Sig Dispense Refill    ascorbic acid, vitamin C, (VITAMIN C) 500 MG tablet Take 500 mg by mouth once daily.      aspirin (ECOTRIN) 81 MG EC tablet Take 1 tablet (81 mg total) by mouth once daily. 90 tablet 3    b complex vitamins capsule Take 1 capsule by mouth once daily.      cholecalciferol, vitamin D3, (VITAMIN D3) 25 mcg (1,000 unit) capsule Take 1,000 Units by mouth once daily.      metoprolol succinate (TOPROL-XL) 25 MG 24 hr tablet Take 1 tablet (25 mg total) by mouth once daily. 90 tablet 3    MULTIVIT,CALC,MINS/IRON/FOLIC (ONE-A-DAY WOMENS FORMULA ORAL) Take by mouth.      SYNTHROID 75 mcg tablet TAKE 1 TABLET (75 MCG TOTAL) BY MOUTH BEFORE BREAKFAST. 90 tablet 1    multivitamin capsule Take 1 capsule by mouth.      terbinafine HCL (LAMISIL) 250 mg tablet  (Patient not taking: Reported on 4/29/2024)       No current facility-administered medications on file prior to visit.       BP (!) 90/50   Pulse 72   Ht 4' 11" (1.499 m)   Wt 51.5 kg (113 lb 8.6 oz)   SpO2 98%   BMI 22.93 kg/m²     Objective:     Physical Exam  Constitutional:       General: She is not in acute distress.     Appearance: Normal appearance. She is well-developed. She is not toxic-appearing or diaphoretic.   HENT:      Head: Normocephalic and atraumatic.      Nose: Nose normal.   Eyes:      General:         Right eye: No discharge.         Left eye: No discharge.      Conjunctiva/sclera:      Right eye: Right conjunctiva is not injected.      Left eye: Left conjunctiva is not injected.      Pupils: Pupils are " equal.      Right eye: Pupil is round.      Left eye: Pupil is round.   Neck:      Thyroid: No thyromegaly.      Vascular: No carotid bruit or JVD.   Cardiovascular:      Rate and Rhythm: Normal rate and regular rhythm. No extrasystoles are present.     Chest Wall: PMI is not displaced.      Pulses:           Radial pulses are 2+ on the right side and 2+ on the left side.        Femoral pulses are 2+ on the right side and 2+ on the left side.       Dorsalis pedis pulses are 2+ on the right side and 2+ on the left side.        Posterior tibial pulses are 2+ on the right side and 2+ on the left side.      Heart sounds: S1 normal and S2 normal. Murmur heard.      Midsystolic murmur is present with a grade of 2/6 at the upper right sternal border.      No gallop.   Pulmonary:      Effort: Pulmonary effort is normal.      Breath sounds: Normal breath sounds.   Chest:      Comments: Sore over chest wall.  Pacemaker right upper chest.  Abdominal:      Palpations: Abdomen is soft.      Tenderness: There is no abdominal tenderness.   Musculoskeletal:      Cervical back: Neck supple.      Right ankle: No swelling, deformity or ecchymosis.      Left ankle: No swelling, deformity or ecchymosis.   Lymphadenopathy:      Head:      Right side of head: No submandibular adenopathy.      Left side of head: No submandibular adenopathy.      Cervical: No cervical adenopathy.   Skin:     General: Skin is warm and dry.      Findings: No rash.   Neurological:      General: No focal deficit present.      Mental Status: She is alert and oriented to person, place, and time. She is not disoriented.      Cranial Nerves: No cranial nerve deficit.   Psychiatric:         Attention and Perception: Attention normal.         Mood and Affect: Mood and affect normal.         Speech: Speech normal.         Behavior: Behavior normal.         Thought Content: Thought content normal.         Judgment: Judgment normal.         Assessment:     1. Congenital  heart disease in adult    2. Ventricular septal defect    3. Pacemaker    4. Atrioventricular block, complete    5. Ventricular tachycardia    6. Precordial pain    7. Acquired hypothyroidism    8. Obstructive sleep apnea    9. Down syndrome        Plan:     1. Congenital Heart Disease   1986: VSD Repair with patch and Ligation of Patent Truncus.   3/2/2021: Echo: Normal left ventricular size and systolic function. EF 65%. No VSD. Mildly dilated RA. Moderate TR. Pacemaker.   Appears to be doing well.   She has been receiving endocarditis since 1986 and her mother wants this practice continues.   She may have an allergy to amoxicillin   Rx doxycycline 100 mg once before dental visits.     2. Pacemaker   11/17/2016: Medtronic DDDR pacemaker.   12/28/2020: Programmed & Fine: 70% A paced. 100% V paced. 5 episodes of NSVT - longest 9 seconds at 170 bpm. Rate responsiveness was increased. Estimated JAYLIN 2025. 4/8/2021: Programmed & Fine: 93% A paced. 100% V paved. No VT. Rate responsiveness was increased. Estimated JAYLIN 2026. 7/8/2021: Programmed & Fine: 90% A paced. 100% V paced. Two NSVT episodes: 1 sec and 2 sec at rate 165 bpm. Estimated JAYLIN 2026.    3/10/2022: Programmed & Fine: 71% A paced. 100% V paced. One NSVT episode of 15 beats. Estimated JAYLIN 2026. 9/26/2022: Programmed & Fine: 72% A paced. 100% V paced. One 10 beat run of VT. Estimated JAYLIN 2026.   3/27/2023: Programmed & Fine: 84% A paced. 99% V paced. 9/27/2022: One 8 beat VT. Estimated ER 2026. 9/14/2023: Programmed & Fine: 81% A paced. 100% V paced. No VT. Estimated JAYLIN 2026. 4/29/2024: Programmed & Fine: 86% A paced. 100% V paced. 6 VT: longest 4 s at 173 bpm. Estimated JAYLIN 2026.     10/2024: Plan next interrogation. Carelink is on.    3. Atrioventricular Block, Complete   11/17/2016: Received pacemaker.    4. Ventricular Tachycardia   12/28/2020: Programmed & Fine: 5 episodes of NSVT - longest 9 seconds at 170 bpm.   The episode of VT occurred  when she was on metoprolol.   12/28/2020: Metoprolol 25 mg Q24 was resumed.   On metoprolol 25 mg Q24.   No clinical recurrence.    5. History of Chest Pain   4/12/2021: EJ: ER: Atypical chest pain.   8/20/2022: EJ: ER: Atypical chest pain. CTA was negative.   Reassurance.    6. Orthostatic Hypotension   Mentioned in chart.   Used to be given midodrine.      7. Hypothyroidism   2018: Diagnosed.   On levothyroxine 50 mcg Q24.   Dr. Carlin Badillo.    8. Obstructive Sleep Apnea   5/2021: Diagnosed with obstructive sleep apnea. Using CPAP.    9. Down's Syndrome    10. Primary Care   Dr. Domi Porter.     F/u 6 months.    Madison Luciano M.D.

## 2024-06-24 DIAGNOSIS — E03.8 SUBCLINICAL HYPOTHYROIDISM: ICD-10-CM

## 2024-06-24 RX ORDER — LEVOTHYROXINE SODIUM 75 UG/1
75 TABLET ORAL
Qty: 90 TABLET | Refills: 3 | Status: SHIPPED | OUTPATIENT
Start: 2024-06-24

## 2024-09-05 RX ORDER — CLINDAMYCIN HYDROCHLORIDE 300 MG/1
CAPSULE ORAL
Qty: 10 CAPSULE | OUTPATIENT
Start: 2024-09-05

## 2025-03-08 DIAGNOSIS — I47.20 VENTRICULAR TACHYCARDIA: ICD-10-CM

## 2025-03-10 RX ORDER — METOPROLOL SUCCINATE 25 MG/1
25 TABLET, EXTENDED RELEASE ORAL
Qty: 90 TABLET | Refills: 0 | Status: SHIPPED | OUTPATIENT
Start: 2025-03-10

## 2025-04-11 ENCOUNTER — TELEPHONE (OUTPATIENT)
Dept: ENDOCRINOLOGY | Facility: CLINIC | Age: 40
End: 2025-04-11
Payer: MEDICARE

## 2025-04-11 DIAGNOSIS — E03.8 SUBCLINICAL HYPOTHYROIDISM: ICD-10-CM

## 2025-04-11 RX ORDER — LEVOTHYROXINE SODIUM 75 UG/1
75 TABLET ORAL
Qty: 90 TABLET | Refills: 1 | Status: SHIPPED | OUTPATIENT
Start: 2025-04-11

## 2025-05-07 ENCOUNTER — TELEPHONE (OUTPATIENT)
Dept: CARDIOLOGY | Facility: CLINIC | Age: 40
End: 2025-05-07
Payer: MEDICARE

## 2025-06-03 ENCOUNTER — CLINICAL SUPPORT (OUTPATIENT)
Dept: CARDIOLOGY | Facility: CLINIC | Age: 40
End: 2025-06-03
Payer: MEDICARE

## 2025-06-03 DIAGNOSIS — R00.1 BRADYCARDIA, UNSPECIFIED: ICD-10-CM

## 2025-06-03 DIAGNOSIS — Z95.0 PRESENCE OF CARDIAC PACEMAKER: ICD-10-CM

## 2025-08-06 ENCOUNTER — OFFICE VISIT (OUTPATIENT)
Dept: ENDOCRINOLOGY | Facility: CLINIC | Age: 40
End: 2025-08-06
Payer: MEDICARE

## 2025-08-06 ENCOUNTER — LAB VISIT (OUTPATIENT)
Dept: LAB | Facility: HOSPITAL | Age: 40
End: 2025-08-06
Payer: MEDICARE

## 2025-08-06 VITALS
HEART RATE: 69 BPM | DIASTOLIC BLOOD PRESSURE: 53 MMHG | WEIGHT: 112.19 LBS | BODY MASS INDEX: 22.66 KG/M2 | SYSTOLIC BLOOD PRESSURE: 87 MMHG

## 2025-08-06 DIAGNOSIS — Q90.9 DOWN SYNDROME: Primary | ICD-10-CM

## 2025-08-06 DIAGNOSIS — E55.9 VITAMIN D DEFICIENCY: ICD-10-CM

## 2025-08-06 DIAGNOSIS — R79.9 ABNORMAL FINDING OF BLOOD CHEMISTRY, UNSPECIFIED: ICD-10-CM

## 2025-08-06 DIAGNOSIS — E03.9 ACQUIRED HYPOTHYROIDISM: ICD-10-CM

## 2025-08-06 DIAGNOSIS — Q24.9 CONGENITAL HEART DISEASE IN ADULT: ICD-10-CM

## 2025-08-06 DIAGNOSIS — Q90.9 DOWN SYNDROME: ICD-10-CM

## 2025-08-06 LAB
25(OH)D3+25(OH)D2 SERPL-MCNC: 63 NG/ML (ref 30–96)
ABSOLUTE EOSINOPHIL (OHS): 0.09 K/UL
ABSOLUTE MONOCYTE (OHS): 0.5 K/UL (ref 0.3–1)
ABSOLUTE NEUTROPHIL COUNT (OHS): 3.15 K/UL (ref 1.8–7.7)
BASOPHILS # BLD AUTO: 0.08 K/UL
BASOPHILS NFR BLD AUTO: 1.6 %
EAG (OHS): 88 MG/DL (ref 68–131)
ERYTHROCYTE [DISTWIDTH] IN BLOOD BY AUTOMATED COUNT: 13.5 % (ref 11.5–14.5)
HBA1C MFR BLD: 4.7 % (ref 4–5.6)
HCT VFR BLD AUTO: 46.8 % (ref 37–48.5)
HGB BLD-MCNC: 15.7 GM/DL (ref 12–16)
IMM GRANULOCYTES # BLD AUTO: 0.01 K/UL (ref 0–0.04)
IMM GRANULOCYTES NFR BLD AUTO: 0.2 % (ref 0–0.5)
LYMPHOCYTES # BLD AUTO: 1.07 K/UL (ref 1–4.8)
MCH RBC QN AUTO: 32.3 PG (ref 27–31)
MCHC RBC AUTO-ENTMCNC: 33.5 G/DL (ref 32–36)
MCV RBC AUTO: 96 FL (ref 82–98)
NUCLEATED RBC (/100WBC) (OHS): 0 /100 WBC
PLATELET # BLD AUTO: 233 K/UL (ref 150–450)
PMV BLD AUTO: 8.8 FL (ref 9.2–12.9)
RBC # BLD AUTO: 4.86 M/UL (ref 4–5.4)
RELATIVE EOSINOPHIL (OHS): 1.8 %
RELATIVE LYMPHOCYTE (OHS): 21.8 % (ref 18–48)
RELATIVE MONOCYTE (OHS): 10.2 % (ref 4–15)
RELATIVE NEUTROPHIL (OHS): 64.4 % (ref 38–73)
TSH SERPL-ACNC: 1.28 UIU/ML (ref 0.4–4)
WBC # BLD AUTO: 4.9 K/UL (ref 3.9–12.7)

## 2025-08-06 PROCEDURE — 85025 COMPLETE CBC W/AUTO DIFF WBC: CPT

## 2025-08-06 PROCEDURE — 99999 PR PBB SHADOW E&M-EST. PATIENT-LVL III: CPT | Mod: PBBFAC,,, | Performed by: INTERNAL MEDICINE

## 2025-08-06 PROCEDURE — G2211 COMPLEX E/M VISIT ADD ON: HCPCS | Mod: ,,, | Performed by: INTERNAL MEDICINE

## 2025-08-06 PROCEDURE — 36415 COLL VENOUS BLD VENIPUNCTURE: CPT

## 2025-08-06 PROCEDURE — 99213 OFFICE O/P EST LOW 20 MIN: CPT | Mod: PBBFAC | Performed by: INTERNAL MEDICINE

## 2025-08-06 PROCEDURE — 99213 OFFICE O/P EST LOW 20 MIN: CPT | Mod: S$PBB,,, | Performed by: INTERNAL MEDICINE

## 2025-08-06 PROCEDURE — 83036 HEMOGLOBIN GLYCOSYLATED A1C: CPT

## 2025-08-06 PROCEDURE — 84443 ASSAY THYROID STIM HORMONE: CPT

## 2025-08-06 PROCEDURE — 82306 VITAMIN D 25 HYDROXY: CPT

## 2025-08-06 NOTE — ASSESSMENT & PLAN NOTE
Endocrinopathies associated with Down syndrome include hypothyroidism and diabetes (positively correlated with type 1 diabetes). Screening A1c was normal in 2018 and 2020. Serum glucose normal on labs in 2019.    Check repeat A1c.

## 2025-08-06 NOTE — PROGRESS NOTES
Subjective:      Chief Complaint: Follow-up for hypothyroidism    HPI: Kan Matamoros is a 40 y.o. female who is here for follow-up evaluation for hypothyroidism.    The patient's last visit with me was on 2/28/2024.    Updates: 8/6/2025  Staying active with multiple physical activities - swimming, Bocce, etc. Feeling good with the current dose of thyroid hormone. Weight is down some since last visit due to increasing activity.    Current medications:  Brand name Synthroid 75 mcg once daily      Taking vitamin D 1000 IUs once daily    History:  Kan has Down syndrome and has a history of large VSD and a PDA with heart failure, which was repaired when she was 1 years old. She has a pacemaker and is on midodrine for orthostatic hypotension. She was referred in March, 2018 from Dr. Westbrook due to abnormal TFTs. She was having episodes of fatigue, weakness, lightheadedness and near syncope, for which she underwent tilt-table testing. We started her on Synthroid 50 mcg daily in 3/2018. Her mom, Chad also has Hashimoto's disease and is on Synthroid.             Lab Results   Component Value Date    TSH 0.330 (L) 02/28/2024    TSH <0.10 (L) 10/07/2021    TSH 5.931 (H) 06/17/2021    FREET4 1.00 02/28/2024    FREET4 0.86 06/17/2021    FREET4 1.00 08/03/2018           Reviewed past medical, family, social history and updated as appropriate.      Objective:     Vitals:    08/06/25 0840   BP: (!) 87/53   Pulse: 69           Physical Exam  Vitals and nursing note reviewed.   Constitutional:       General: She is not in acute distress.     Appearance: She is well-developed.      Comments: Short stature with classic Down syndrome features   HENT:      Head: Normocephalic and atraumatic.   Eyes:      General:         Right eye: No discharge.         Left eye: No discharge.      Conjunctiva/sclera: Conjunctivae normal.   Neck:      Thyroid: No thyromegaly.      Trachea: No tracheal deviation.   Cardiovascular:      Rate and Rhythm:  Normal rate.   Pulmonary:      Effort: Pulmonary effort is normal. No respiratory distress.   Musculoskeletal:      Comments: No digital clubbing or extremity cyanosis   Skin:     General: Skin is warm and dry.      Comments: Mottled skin   Neurological:      Mental Status: She is alert and oriented to person, place, and time. Mental status is at baseline.      Coordination: Coordination normal.   Psychiatric:         Mood and Affect: Mood normal.         Behavior: Behavior normal.         Wt Readings from Last 10 Encounters:   08/06/25 0840 50.9 kg (112 lb 3.4 oz)   04/29/24 1519 51.5 kg (113 lb 8.6 oz)   02/28/24 0912 52.7 kg (116 lb 4.7 oz)   09/14/23 1601 55.9 kg (123 lb 3.8 oz)   03/27/23 1644 56 kg (123 lb 7.3 oz)   09/26/22 1608 56 kg (123 lb 7.3 oz)   08/25/22 1219 54.6 kg (120 lb 7.7 oz)   06/10/22 0847 55.3 kg (122 lb 0.4 oz)   03/10/22 1538 53.8 kg (118 lb 9.7 oz)   10/07/21 1606 54.2 kg (119 lb 6.1 oz)     Lab Results   Component Value Date     03/02/2023    K 4.0 03/02/2023     06/26/2019    CO2 29 03/02/2023    GLU 90 03/02/2023    BUN 15 03/02/2023    CREATININE 0.88 03/02/2023    CALCIUM 9.1 03/02/2023    PROT 7.4 02/12/2018    ALBUMIN 3.9 03/02/2023    BILITOT 0.7 03/02/2023    ALKPHOS 74 03/02/2023    AST 26 03/02/2023    ALT 16 03/02/2023    ANIONGAP 8 06/26/2019    ESTGFRAFRICA >60.0 06/26/2019    EGFRNONAA >60.0 06/26/2019    TSH 0.330 (L) 02/28/2024        Assessment/Plan:     Hypothyroidism  Currently on Synthroid 75 mcg daily. Check TFTs now.    She is taking it appropriately first thing in the AM, waiting 30 minutes prior to eating/drinking.      Down syndrome  Endocrinopathies associated with Down syndrome include hypothyroidism and diabetes (positively correlated with type 1 diabetes). Screening A1c was normal in 2018 and 2020. Serum glucose normal on labs in 2019.    Check repeat A1c.    Congenital heart disease in adult  Avoid exogenous thyrotoxicosis.  Now seeing   Kjellgren     Vitamin D deficiency  Continue same supplement.  Check Vitamin D      RTC 12 months

## (undated) DEVICE — PAD DEFIB CADENCE ADULT R2